# Patient Record
Sex: FEMALE | Race: WHITE | NOT HISPANIC OR LATINO | Employment: OTHER | URBAN - METROPOLITAN AREA
[De-identification: names, ages, dates, MRNs, and addresses within clinical notes are randomized per-mention and may not be internally consistent; named-entity substitution may affect disease eponyms.]

---

## 2021-08-20 PROBLEM — G20.A1 PARKINSON DISEASE (HCC): Status: ACTIVE | Noted: 2018-05-31

## 2021-08-20 PROBLEM — E55.9 VITAMIN D DEFICIENCY: Status: ACTIVE | Noted: 2021-08-20

## 2021-08-20 PROBLEM — N95.1 MENOPAUSAL AND FEMALE CLIMACTERIC STATES: Status: ACTIVE | Noted: 2021-08-20

## 2021-08-20 PROBLEM — E53.8 VITAMIN B12 DEFICIENCY: Status: ACTIVE | Noted: 2021-08-20

## 2022-02-11 PROBLEM — D05.10 DCIS (DUCTAL CARCINOMA IN SITU): Status: ACTIVE | Noted: 2022-02-11

## 2022-02-11 PROBLEM — H26.9 CATARACT: Status: ACTIVE | Noted: 2022-02-11

## 2022-02-11 PROBLEM — G20.A1 PARKINSONS (HCC): Status: ACTIVE | Noted: 2022-02-11

## 2022-02-11 PROBLEM — G25.81 RLS (RESTLESS LEGS SYNDROME): Status: ACTIVE | Noted: 2022-02-11

## 2022-02-28 PROBLEM — N63.25 UNSPECIFIED LUMP IN THE LEFT BREAST, OVERLAPPING QUADRANTS: Status: ACTIVE | Noted: 2022-02-28

## 2022-03-10 PROBLEM — C79.51 BONE METASTASIS: Status: ACTIVE | Noted: 2022-03-10

## 2022-03-18 ENCOUNTER — TELEPHONE (OUTPATIENT)
Dept: CARDIOLOGY | Facility: MEDICAL CENTER | Age: 72
End: 2022-03-18
Payer: MEDICARE

## 2022-03-18 NOTE — TELEPHONE ENCOUNTER
Spoke with pt who confirmed last time seeing cardiology was in 05/12/21 with [facility] (records in care everywhere). Per pt has not seen any other cardiology outside of [facility]. Per pt has not had any recent hospitalizations outside of [facility]. Confirmed with pt that recent lab work and cardiac testing is in pts chart.     Pt request to reschedule. Scheduling team will reach out to assist.

## 2022-04-07 PROBLEM — C50.412 MALIGNANT NEOPLASM OF UPPER-OUTER QUADRANT OF LEFT BREAST IN FEMALE, ESTROGEN RECEPTOR POSITIVE (HCC): Status: ACTIVE | Noted: 2022-04-07

## 2022-04-07 PROBLEM — Z17.0 MALIGNANT NEOPLASM OF UPPER-OUTER QUADRANT OF LEFT BREAST IN FEMALE, ESTROGEN RECEPTOR POSITIVE (HCC): Status: ACTIVE | Noted: 2022-04-07

## 2022-04-15 ENCOUNTER — APPOINTMENT (OUTPATIENT)
Dept: RADIOLOGY | Facility: MEDICAL CENTER | Age: 72
DRG: 641 | End: 2022-04-15
Attending: EMERGENCY MEDICINE
Payer: MEDICARE

## 2022-04-15 ENCOUNTER — HOSPITAL ENCOUNTER (INPATIENT)
Facility: MEDICAL CENTER | Age: 72
LOS: 2 days | DRG: 641 | End: 2022-04-17
Attending: EMERGENCY MEDICINE | Admitting: STUDENT IN AN ORGANIZED HEALTH CARE EDUCATION/TRAINING PROGRAM
Payer: MEDICARE

## 2022-04-15 DIAGNOSIS — E87.6 HYPOKALEMIA: ICD-10-CM

## 2022-04-15 DIAGNOSIS — R64 CANCER CACHEXIA (HCC): ICD-10-CM

## 2022-04-15 DIAGNOSIS — E43 SEVERE PROTEIN-CALORIE MALNUTRITION (HCC): ICD-10-CM

## 2022-04-15 DIAGNOSIS — R07.9 CHEST PAIN, UNSPECIFIED TYPE: ICD-10-CM

## 2022-04-15 PROBLEM — Z71.89 ACP (ADVANCE CARE PLANNING): Status: ACTIVE | Noted: 2022-04-15

## 2022-04-15 PROBLEM — R63.6 UNDERWEIGHT: Status: ACTIVE | Noted: 2022-04-15

## 2022-04-15 PROBLEM — G89.4 CHRONIC PAIN SYNDROME: Status: ACTIVE | Noted: 2022-04-15

## 2022-04-15 LAB
ALBUMIN SERPL BCP-MCNC: 4.6 G/DL (ref 3.2–4.9)
ALBUMIN/GLOB SERPL: 2.3 G/DL
ALP SERPL-CCNC: 116 U/L (ref 30–99)
ALT SERPL-CCNC: 31 U/L (ref 2–50)
ANION GAP SERPL CALC-SCNC: 15 MMOL/L (ref 7–16)
AST SERPL-CCNC: 30 U/L (ref 12–45)
BASOPHILS # BLD AUTO: 0.4 % (ref 0–1.8)
BASOPHILS # BLD: 0.02 K/UL (ref 0–0.12)
BILIRUB SERPL-MCNC: 0.4 MG/DL (ref 0.1–1.5)
BUN SERPL-MCNC: 16 MG/DL (ref 8–22)
CALCIUM SERPL-MCNC: 9.1 MG/DL (ref 8.5–10.5)
CHLORIDE SERPL-SCNC: 100 MMOL/L (ref 96–112)
CO2 SERPL-SCNC: 26 MMOL/L (ref 20–33)
CREAT SERPL-MCNC: 0.4 MG/DL (ref 0.5–1.4)
D DIMER PPP IA.FEU-MCNC: <0.27 UG/ML (FEU) (ref 0–0.5)
EKG IMPRESSION: NORMAL
EOSINOPHIL # BLD AUTO: 0.03 K/UL (ref 0–0.51)
EOSINOPHIL NFR BLD: 0.6 % (ref 0–6.9)
ERYTHROCYTE [DISTWIDTH] IN BLOOD BY AUTOMATED COUNT: 44.4 FL (ref 35.9–50)
GFR SERPLBLD CREATININE-BSD FMLA CKD-EPI: 106 ML/MIN/1.73 M 2
GLOBULIN SER CALC-MCNC: 2 G/DL (ref 1.9–3.5)
GLUCOSE SERPL-MCNC: 93 MG/DL (ref 65–99)
HCT VFR BLD AUTO: 34.9 % (ref 37–47)
HGB BLD-MCNC: 12.3 G/DL (ref 12–16)
IMM GRANULOCYTES # BLD AUTO: 0.01 K/UL (ref 0–0.11)
IMM GRANULOCYTES NFR BLD AUTO: 0.2 % (ref 0–0.9)
LIPASE SERPL-CCNC: 20 U/L (ref 11–82)
LYMPHOCYTES # BLD AUTO: 1.33 K/UL (ref 1–4.8)
LYMPHOCYTES NFR BLD: 25.7 % (ref 22–41)
MCH RBC QN AUTO: 32.6 PG (ref 27–33)
MCHC RBC AUTO-ENTMCNC: 35.2 G/DL (ref 33.6–35)
MCV RBC AUTO: 92.6 FL (ref 81.4–97.8)
MONOCYTES # BLD AUTO: 0.38 K/UL (ref 0–0.85)
MONOCYTES NFR BLD AUTO: 7.3 % (ref 0–13.4)
NEUTROPHILS # BLD AUTO: 3.41 K/UL (ref 2–7.15)
NEUTROPHILS NFR BLD: 65.8 % (ref 44–72)
NRBC # BLD AUTO: 0 K/UL
NRBC BLD-RTO: 0 /100 WBC
PLATELET # BLD AUTO: 247 K/UL (ref 164–446)
PMV BLD AUTO: 9.3 FL (ref 9–12.9)
POTASSIUM SERPL-SCNC: 2.6 MMOL/L (ref 3.6–5.5)
PROT SERPL-MCNC: 6.6 G/DL (ref 6–8.2)
RBC # BLD AUTO: 3.77 M/UL (ref 4.2–5.4)
SODIUM SERPL-SCNC: 141 MMOL/L (ref 135–145)
TROPONIN T SERPL-MCNC: 12 NG/L (ref 6–19)
TROPONIN T SERPL-MCNC: 14 NG/L (ref 6–19)
WBC # BLD AUTO: 5.2 K/UL (ref 4.8–10.8)

## 2022-04-15 PROCEDURE — 36415 COLL VENOUS BLD VENIPUNCTURE: CPT

## 2022-04-15 PROCEDURE — 99285 EMERGENCY DEPT VISIT HI MDM: CPT

## 2022-04-15 PROCEDURE — 93005 ELECTROCARDIOGRAM TRACING: CPT | Performed by: EMERGENCY MEDICINE

## 2022-04-15 PROCEDURE — 85025 COMPLETE CBC W/AUTO DIFF WBC: CPT

## 2022-04-15 PROCEDURE — 770020 HCHG ROOM/CARE - TELE (206)

## 2022-04-15 PROCEDURE — 700101 HCHG RX REV CODE 250: Performed by: STUDENT IN AN ORGANIZED HEALTH CARE EDUCATION/TRAINING PROGRAM

## 2022-04-15 PROCEDURE — 85379 FIBRIN DEGRADATION QUANT: CPT

## 2022-04-15 PROCEDURE — 96365 THER/PROPH/DIAG IV INF INIT: CPT

## 2022-04-15 PROCEDURE — 96368 THER/DIAG CONCURRENT INF: CPT

## 2022-04-15 PROCEDURE — 99223 1ST HOSP IP/OBS HIGH 75: CPT | Mod: AI,25 | Performed by: STUDENT IN AN ORGANIZED HEALTH CARE EDUCATION/TRAINING PROGRAM

## 2022-04-15 PROCEDURE — 96375 TX/PRO/DX INJ NEW DRUG ADDON: CPT

## 2022-04-15 PROCEDURE — 80053 COMPREHEN METABOLIC PANEL: CPT

## 2022-04-15 PROCEDURE — 700111 HCHG RX REV CODE 636 W/ 250 OVERRIDE (IP): Performed by: EMERGENCY MEDICINE

## 2022-04-15 PROCEDURE — A9270 NON-COVERED ITEM OR SERVICE: HCPCS | Performed by: STUDENT IN AN ORGANIZED HEALTH CARE EDUCATION/TRAINING PROGRAM

## 2022-04-15 PROCEDURE — 71045 X-RAY EXAM CHEST 1 VIEW: CPT

## 2022-04-15 PROCEDURE — 99497 ADVNCD CARE PLAN 30 MIN: CPT | Performed by: STUDENT IN AN ORGANIZED HEALTH CARE EDUCATION/TRAINING PROGRAM

## 2022-04-15 PROCEDURE — 83690 ASSAY OF LIPASE: CPT

## 2022-04-15 PROCEDURE — 84484 ASSAY OF TROPONIN QUANT: CPT

## 2022-04-15 PROCEDURE — 700102 HCHG RX REV CODE 250 W/ 637 OVERRIDE(OP): Performed by: STUDENT IN AN ORGANIZED HEALTH CARE EDUCATION/TRAINING PROGRAM

## 2022-04-15 PROCEDURE — 700111 HCHG RX REV CODE 636 W/ 250 OVERRIDE (IP): Performed by: STUDENT IN AN ORGANIZED HEALTH CARE EDUCATION/TRAINING PROGRAM

## 2022-04-15 RX ORDER — POTASSIUM CHLORIDE 20 MEQ/1
40 TABLET, EXTENDED RELEASE ORAL EVERY 4 HOURS
Status: DISPENSED | OUTPATIENT
Start: 2022-04-15 | End: 2022-04-16

## 2022-04-15 RX ORDER — ACETAMINOPHEN 325 MG/1
650 TABLET ORAL EVERY 6 HOURS PRN
Status: DISCONTINUED | OUTPATIENT
Start: 2022-04-15 | End: 2022-04-17 | Stop reason: HOSPADM

## 2022-04-15 RX ORDER — MORPHINE SULFATE 4 MG/ML
4 INJECTION INTRAVENOUS
Status: DISCONTINUED | OUTPATIENT
Start: 2022-04-15 | End: 2022-04-17 | Stop reason: HOSPADM

## 2022-04-15 RX ORDER — GUAIFENESIN/DEXTROMETHORPHAN 100-10MG/5
10 SYRUP ORAL EVERY 6 HOURS PRN
Status: DISCONTINUED | OUTPATIENT
Start: 2022-04-15 | End: 2022-04-17 | Stop reason: HOSPADM

## 2022-04-15 RX ORDER — MULTIVITAMIN WITH IRON
250 TABLET ORAL EVERY EVENING
COMMUNITY

## 2022-04-15 RX ORDER — GABAPENTIN 300 MG/1
300 CAPSULE ORAL 3 TIMES DAILY
Status: DISCONTINUED | OUTPATIENT
Start: 2022-04-15 | End: 2022-04-15

## 2022-04-15 RX ORDER — HYDRALAZINE HYDROCHLORIDE 20 MG/ML
10 INJECTION INTRAMUSCULAR; INTRAVENOUS EVERY 4 HOURS PRN
Status: DISCONTINUED | OUTPATIENT
Start: 2022-04-15 | End: 2022-04-17 | Stop reason: HOSPADM

## 2022-04-15 RX ORDER — POTASSIUM CHLORIDE 7.45 MG/ML
10 INJECTION INTRAVENOUS ONCE
Status: COMPLETED | OUTPATIENT
Start: 2022-04-15 | End: 2022-04-15

## 2022-04-15 RX ORDER — OXYCODONE HYDROCHLORIDE 10 MG/1
10 TABLET ORAL
Status: DISCONTINUED | OUTPATIENT
Start: 2022-04-15 | End: 2022-04-17 | Stop reason: HOSPADM

## 2022-04-15 RX ORDER — MAGNESIUM SULFATE HEPTAHYDRATE 40 MG/ML
2 INJECTION, SOLUTION INTRAVENOUS ONCE
Status: COMPLETED | OUTPATIENT
Start: 2022-04-15 | End: 2022-04-15

## 2022-04-15 RX ORDER — AMOXICILLIN 250 MG
2 CAPSULE ORAL 2 TIMES DAILY
Status: DISCONTINUED | OUTPATIENT
Start: 2022-04-15 | End: 2022-04-17 | Stop reason: HOSPADM

## 2022-04-15 RX ORDER — CHOLECALCIFEROL (VITAMIN D3) 125 MCG
1000 CAPSULE ORAL DAILY
Status: DISCONTINUED | OUTPATIENT
Start: 2022-04-16 | End: 2022-04-17 | Stop reason: HOSPADM

## 2022-04-15 RX ORDER — OXYCODONE HYDROCHLORIDE 5 MG/1
5 TABLET ORAL
Status: DISCONTINUED | OUTPATIENT
Start: 2022-04-15 | End: 2022-04-17 | Stop reason: HOSPADM

## 2022-04-15 RX ORDER — POLYETHYLENE GLYCOL 3350 17 G/17G
1 POWDER, FOR SOLUTION ORAL
Status: DISCONTINUED | OUTPATIENT
Start: 2022-04-15 | End: 2022-04-17 | Stop reason: HOSPADM

## 2022-04-15 RX ORDER — ONDANSETRON 2 MG/ML
4 INJECTION INTRAMUSCULAR; INTRAVENOUS EVERY 4 HOURS PRN
Status: DISCONTINUED | OUTPATIENT
Start: 2022-04-15 | End: 2022-04-17 | Stop reason: HOSPADM

## 2022-04-15 RX ORDER — SODIUM CHLORIDE AND POTASSIUM CHLORIDE 150; 900 MG/100ML; MG/100ML
INJECTION, SOLUTION INTRAVENOUS CONTINUOUS
Status: DISPENSED | OUTPATIENT
Start: 2022-04-15 | End: 2022-04-16

## 2022-04-15 RX ORDER — ONDANSETRON 4 MG/1
4 TABLET, ORALLY DISINTEGRATING ORAL EVERY 4 HOURS PRN
Status: DISCONTINUED | OUTPATIENT
Start: 2022-04-15 | End: 2022-04-17 | Stop reason: HOSPADM

## 2022-04-15 RX ORDER — MORPHINE SULFATE 4 MG/ML
4 INJECTION INTRAVENOUS ONCE
Status: COMPLETED | OUTPATIENT
Start: 2022-04-15 | End: 2022-04-15

## 2022-04-15 RX ORDER — ACETAMINOPHEN 325 MG/1
650 TABLET ORAL EVERY 4 HOURS PRN
COMMUNITY

## 2022-04-15 RX ORDER — BISACODYL 10 MG
10 SUPPOSITORY, RECTAL RECTAL
Status: DISCONTINUED | OUTPATIENT
Start: 2022-04-15 | End: 2022-04-17 | Stop reason: HOSPADM

## 2022-04-15 RX ADMIN — POTASSIUM CHLORIDE 40 MEQ: 20 TABLET, EXTENDED RELEASE ORAL at 21:00

## 2022-04-15 RX ADMIN — POTASSIUM CHLORIDE AND SODIUM CHLORIDE: 900; 150 INJECTION, SOLUTION INTRAVENOUS at 21:35

## 2022-04-15 RX ADMIN — POTASSIUM CHLORIDE 10 MEQ: 7.46 INJECTION, SOLUTION INTRAVENOUS at 20:01

## 2022-04-15 RX ADMIN — FENTANYL CITRATE 50 MCG: 50 INJECTION, SOLUTION INTRAMUSCULAR; INTRAVENOUS at 19:22

## 2022-04-15 RX ADMIN — MAGNESIUM SULFATE HEPTAHYDRATE 2 G: 40 INJECTION, SOLUTION INTRAVENOUS at 20:01

## 2022-04-15 RX ADMIN — MORPHINE SULFATE 4 MG: 4 INJECTION INTRAVENOUS at 18:00

## 2022-04-15 ASSESSMENT — ENCOUNTER SYMPTOMS
BRUISES/BLEEDS EASILY: 0
DEPRESSION: 0
WEIGHT LOSS: 1
FEVER: 0
WEAKNESS: 1
FLANK PAIN: 0
HEADACHES: 0
BACK PAIN: 1
MYALGIAS: 1
DOUBLE VISION: 0
PALPITATIONS: 1
COUGH: 0
BLURRED VISION: 0
CHILLS: 0
DIZZINESS: 1
HEARTBURN: 0
FOCAL WEAKNESS: 0
NAUSEA: 0
SHORTNESS OF BREATH: 0

## 2022-04-15 ASSESSMENT — LIFESTYLE VARIABLES
EVER FELT BAD OR GUILTY ABOUT YOUR DRINKING: NO
CONSUMPTION TOTAL: NEGATIVE
ON A TYPICAL DAY WHEN YOU DRINK ALCOHOL HOW MANY DRINKS DO YOU HAVE: 0
ALCOHOL_USE: NO
HAVE PEOPLE ANNOYED YOU BY CRITICIZING YOUR DRINKING: NO
DOES PATIENT WANT TO STOP DRINKING: CANNOT ASSESS
SUBSTANCE_ABUSE: 0
TOTAL SCORE: 0
HOW MANY TIMES IN THE PAST YEAR HAVE YOU HAD 5 OR MORE DRINKS IN A DAY: 0
TOTAL SCORE: 0
TOTAL SCORE: 0
EVER HAD A DRINK FIRST THING IN THE MORNING TO STEADY YOUR NERVES TO GET RID OF A HANGOVER: NO
HAVE YOU EVER FELT YOU SHOULD CUT DOWN ON YOUR DRINKING: NO
AVERAGE NUMBER OF DAYS PER WEEK YOU HAVE A DRINK CONTAINING ALCOHOL: 0

## 2022-04-15 ASSESSMENT — COGNITIVE AND FUNCTIONAL STATUS - GENERAL
STANDING UP FROM CHAIR USING ARMS: A LOT
PERSONAL GROOMING: A LOT
MOBILITY SCORE: 14
DAILY ACTIVITIY SCORE: 15
TOILETING: A LITTLE
MOVING TO AND FROM BED TO CHAIR: A LITTLE
CLIMB 3 TO 5 STEPS WITH RAILING: A LOT
MOVING FROM LYING ON BACK TO SITTING ON SIDE OF FLAT BED: A LITTLE
HELP NEEDED FOR BATHING: A LITTLE
SUGGESTED CMS G CODE MODIFIER MOBILITY: CL
WALKING IN HOSPITAL ROOM: A LOT
EATING MEALS: A LOT
TURNING FROM BACK TO SIDE WHILE IN FLAT BAD: A LOT
DRESSING REGULAR UPPER BODY CLOTHING: A LOT
SUGGESTED CMS G CODE MODIFIER DAILY ACTIVITY: CK
DRESSING REGULAR LOWER BODY CLOTHING: A LITTLE

## 2022-04-15 ASSESSMENT — PATIENT HEALTH QUESTIONNAIRE - PHQ9
SUM OF ALL RESPONSES TO PHQ9 QUESTIONS 1 AND 2: 0
1. LITTLE INTEREST OR PLEASURE IN DOING THINGS: NOT AT ALL
2. FEELING DOWN, DEPRESSED, IRRITABLE, OR HOPELESS: NOT AT ALL

## 2022-04-15 ASSESSMENT — FIBROSIS 4 INDEX
FIB4 SCORE: 1.55
FIB4 SCORE: 1.35

## 2022-04-15 ASSESSMENT — PAIN DESCRIPTION - PAIN TYPE: TYPE: ACUTE PAIN

## 2022-04-15 NOTE — ED TRIAGE NOTES
Pt bib ems medical office.  Chief Complaint   Patient presents with   • Chest Pain   • Epigastric Pain     Pt was getting a vit C injection by homeopathic cancer doctor, has sudden onset of CP. Drank baking soda which did not help. EMS called.     PTA Asa, NTG, Fentanyl, Zofran. Pt reports she is feeling better now.     Pt on a gown, connected to monitor, chart up for ERP.

## 2022-04-15 NOTE — ED PROVIDER NOTES
ED Provider Note    CHIEF COMPLAINT  Chief Complaint   Patient presents with   • Chest Pain   • Epigastric Pain       HPI  Shenabrenda Lane is a 71 y.o. female who presents with chief complaint of chest pain.  Patient has a history of breast cancer, she also has a history of Parkinson's.  Patient's breast cancer is metastatic, with known metastases throughout her spine.  She reports that she started seeing a homeopathic doctor, via telemedicine in [place], who ordered a vitamin C infusion for her.  This was done today at an infusion center.  While patient was receiving the vitamin C infusion, once he started running she started developing some chest pain.  Chest pain is most localized in her central chest with radiations into bilateral hemithoraces.  She denies any associated pain radiating to her back or any tearing quality the pain.  She denies any fevers or chills.  She denies any associated cough, she denies any associated pleuritic pain.  She denies any unilateral leg swelling.  She denies any history of blood clots.  Patient reports pain is constant without any overt identifiable provoking or relieving factors.    REVIEW OF SYSTEMS  ROS    See HPI for further details. All other systems are negative.     PAST MEDICAL HISTORY   has a past medical history of Breast cancer (HCC), Chicken pox, Glaucoma, Hepatitis A, History of hormone therapy, Jaundice, Measles, and Parkinson disease (HCC).    SOCIAL HISTORY  Social History     Tobacco Use   • Smoking status: Never Smoker   • Smokeless tobacco: Never Used   Vaping Use   • Vaping Use: Never used   Substance and Sexual Activity   • Alcohol use: Never   • Drug use: Yes     Comment: CBD oil   • Sexual activity: Not on file       SURGICAL HISTORY   has a past surgical history that includes other abdominal surgery and other orthopedic surgery.    CURRENT MEDICATIONS  Home Medications     Reviewed by Krystal Contreras R.N. (Registered Nurse) on 04/15/22 at 8909  Med List  Status: Partial   Medication Last Dose Status   Aloe Vera 500 MG Cap  Active   ascorbic acid (VITAMIN C) 250 MG tablet  Active   B Complex Cap  Active   carbidopa-levodopa (SINEMET)  MG Tab  Active   Cholecalciferol 1.25 MG (73918 UT) Tab  Active   Cyanocobalamin (VITAMIN B 12 PO)  Active   DIGESTIVE ENZYMES PO  Active   gabapentin (NEURONTIN) 300 MG Cap  Active   ibuprofen (MOTRIN) 200 MG Tab  Active   magnesium sulfate 50 % Solution  Active   Palbociclib 125 MG Cap  Active   Potassium 99 MG Tab  Active   Probiotic Product (PROBIOTIC-10 PO)  Active   Turmeric 500 MG Cap  Active                ALLERGIES  Allergies   Allergen Reactions   • Sulfa Drugs        PHYSICAL EXAM  Vitals:    04/15/22 1552   BP:    Pulse:    Resp:    Temp: 36.6 °C (97.8 °F)   SpO2:        Physical Exam  Constitutional:       Appearance: She is well-developed.      Comments: Cachectic   HENT:      Head: Normocephalic and atraumatic.   Eyes:      Conjunctiva/sclera: Conjunctivae normal.   Cardiovascular:      Rate and Rhythm: Normal rate and regular rhythm.   Pulmonary:      Effort: Pulmonary effort is normal.      Breath sounds: Normal breath sounds.   Abdominal:      General: Bowel sounds are normal. There is no distension.      Palpations: Abdomen is soft.      Tenderness: There is no abdominal tenderness. There is no rebound.   Musculoskeletal:      Cervical back: Normal range of motion and neck supple.   Skin:     General: Skin is warm and dry.      Findings: No rash.   Neurological:      Mental Status: She is alert and oriented to person, place, and time.   Psychiatric:         Behavior: Behavior normal.           DIAGNOSTIC STUDIES / PROCEDURES    EKG  See below    LABS  Results for orders placed or performed during the hospital encounter of 04/15/22   CBC with Differential   Result Value Ref Range    WBC 5.2 4.8 - 10.8 K/uL    RBC 3.77 (L) 4.20 - 5.40 M/uL    Hemoglobin 12.3 12.0 - 16.0 g/dL    Hematocrit 34.9 (L) 37.0 - 47.0 %     MCV 92.6 81.4 - 97.8 fL    MCH 32.6 27.0 - 33.0 pg    MCHC 35.2 (H) 33.6 - 35.0 g/dL    RDW 44.4 35.9 - 50.0 fL    Platelet Count 247 164 - 446 K/uL    MPV 9.3 9.0 - 12.9 fL    Neutrophils-Polys 65.80 44.00 - 72.00 %    Lymphocytes 25.70 22.00 - 41.00 %    Monocytes 7.30 0.00 - 13.40 %    Eosinophils 0.60 0.00 - 6.90 %    Basophils 0.40 0.00 - 1.80 %    Immature Granulocytes 0.20 0.00 - 0.90 %    Nucleated RBC 0.00 /100 WBC    Neutrophils (Absolute) 3.41 2.00 - 7.15 K/uL    Lymphs (Absolute) 1.33 1.00 - 4.80 K/uL    Monos (Absolute) 0.38 0.00 - 0.85 K/uL    Eos (Absolute) 0.03 0.00 - 0.51 K/uL    Baso (Absolute) 0.02 0.00 - 0.12 K/uL    Immature Granulocytes (abs) 0.01 0.00 - 0.11 K/uL    NRBC (Absolute) 0.00 K/uL   Complete Metabolic Panel (CMP)   Result Value Ref Range    Sodium 141 135 - 145 mmol/L    Potassium 2.6 (LL) 3.6 - 5.5 mmol/L    Chloride 100 96 - 112 mmol/L    Co2 26 20 - 33 mmol/L    Anion Gap 15.0 7.0 - 16.0    Glucose 93 65 - 99 mg/dL    Bun 16 8 - 22 mg/dL    Creatinine 0.40 (L) 0.50 - 1.40 mg/dL    Calcium 9.1 8.5 - 10.5 mg/dL    AST(SGOT) 30 12 - 45 U/L    ALT(SGPT) 31 2 - 50 U/L    Alkaline Phosphatase 116 (H) 30 - 99 U/L    Total Bilirubin 0.4 0.1 - 1.5 mg/dL    Albumin 4.6 3.2 - 4.9 g/dL    Total Protein 6.6 6.0 - 8.2 g/dL    Globulin 2.0 1.9 - 3.5 g/dL    A-G Ratio 2.3 g/dL   Troponin   Result Value Ref Range    Troponin T 12 6 - 19 ng/L   D-DIMER   Result Value Ref Range    D-Dimer Screen <0.27 0.00 - 0.50 ug/mL (FEU)   LIPASE   Result Value Ref Range    Lipase 20 11 - 82 U/L   ESTIMATED GFR   Result Value Ref Range    GFR (CKD-EPI) 106 >60 mL/min/1.73 m 2   EKG   Result Value Ref Range    Report       [facility] Emergency Dept.    Test Date:  2022-04-15  Pt Name:    ALLY  FAST FAST             Department: ER  MRN:        [id number]                      Room:       [facility]  Gender:     Female                       Technician: [id number]  :        [id number]                    Requested By:ER TRIAGE PROTOCOL  Order #:    [id number]                    Reading MD: Agustin Lyman MD    Measurements  Intervals                                Axis  Rate:       82                           P:          60  CT:         152                          QRS:        30  QRSD:       76                           T:          84  QT:         424  QTc:        496    Interpretive Statements  NormalEKG is normal sinus rhythm, axis normal intervals, bizarre sloping T  waves  in V3 but this is not in any other contiguous leads.  No ST depression.  Electronically Signed On 4- 16:36:36 PDT by Agustin Lyman MD           RADIOLOGY  DX-CHEST-PORTABLE (1 VIEW)   Final Result         1. No acute cardiopulmonary abnormalities are identified.              COURSE & MEDICAL DECISION MAKING  Pertinent Labs & Imaging studies reviewed. (See chart for details)    Well-appearing patient here with very reassuring vitals, patient with chest pain after receiving IV dose of vitamin C.  Unlikely that the vitamin C is actually causative here though certainly a hypersensitivity reaction or iatrogenic causes possible.  Patient's EKG fails to reveal any evidence of acute regional ischemia.  She certainly is at risk for PE.  Will check an x-ray first to ensure that patient does not have a large pleural effusion or alternative cause of her symptoms, if x-ray is unremarkable will check CTA.  Patient's basic labs reveal severe hypokalemia.  Her CTA fails to reveal any acute pulmonary embolism.  Given her severe electrolyte abnormalities patient will be admitted for further care.  I discussed case with hospitalist who is agreed to admit.    The patient will return for worsening symptoms and is stable at the time of discharge. The patient verbalizes understanding and will comply.    FINAL IMPRESSION    1. Hypokalemia    2. Chest pain, unspecified type            Electronically signed by: Nura Velez M.D., 4/15/2022 4:06 PM

## 2022-04-16 LAB
ALBUMIN SERPL BCP-MCNC: 4.1 G/DL (ref 3.2–4.9)
BASOPHILS # BLD AUTO: 0.6 % (ref 0–1.8)
BASOPHILS # BLD: 0.03 K/UL (ref 0–0.12)
BUN SERPL-MCNC: 14 MG/DL (ref 8–22)
CALCIUM SERPL-MCNC: 8.8 MG/DL (ref 8.5–10.5)
CHLORIDE SERPL-SCNC: 103 MMOL/L (ref 96–112)
CO2 SERPL-SCNC: 25 MMOL/L (ref 20–33)
CREAT SERPL-MCNC: 0.4 MG/DL (ref 0.5–1.4)
EOSINOPHIL # BLD AUTO: 0.05 K/UL (ref 0–0.51)
EOSINOPHIL NFR BLD: 1 % (ref 0–6.9)
ERYTHROCYTE [DISTWIDTH] IN BLOOD BY AUTOMATED COUNT: 45.3 FL (ref 35.9–50)
GFR SERPLBLD CREATININE-BSD FMLA CKD-EPI: 106 ML/MIN/1.73 M 2
GLUCOSE SERPL-MCNC: 95 MG/DL (ref 65–99)
HCT VFR BLD AUTO: 33.2 % (ref 37–47)
HGB BLD-MCNC: 11.7 G/DL (ref 12–16)
IMM GRANULOCYTES # BLD AUTO: 0.01 K/UL (ref 0–0.11)
IMM GRANULOCYTES NFR BLD AUTO: 0.2 % (ref 0–0.9)
LYMPHOCYTES # BLD AUTO: 1.42 K/UL (ref 1–4.8)
LYMPHOCYTES NFR BLD: 27.7 % (ref 22–41)
MAGNESIUM SERPL-MCNC: 2.3 MG/DL (ref 1.5–2.5)
MCH RBC QN AUTO: 32.6 PG (ref 27–33)
MCHC RBC AUTO-ENTMCNC: 35.2 G/DL (ref 33.6–35)
MCV RBC AUTO: 92.5 FL (ref 81.4–97.8)
MONOCYTES # BLD AUTO: 0.45 K/UL (ref 0–0.85)
MONOCYTES NFR BLD AUTO: 8.8 % (ref 0–13.4)
NEUTROPHILS # BLD AUTO: 3.16 K/UL (ref 2–7.15)
NEUTROPHILS NFR BLD: 61.7 % (ref 44–72)
NRBC # BLD AUTO: 0 K/UL
NRBC BLD-RTO: 0 /100 WBC
PHOSPHATE SERPL-MCNC: 2.3 MG/DL (ref 2.5–4.5)
PLATELET # BLD AUTO: 219 K/UL (ref 164–446)
PMV BLD AUTO: 9.2 FL (ref 9–12.9)
POTASSIUM SERPL-SCNC: 3.3 MMOL/L (ref 3.6–5.5)
POTASSIUM SERPL-SCNC: 3.4 MMOL/L (ref 3.6–5.5)
RBC # BLD AUTO: 3.59 M/UL (ref 4.2–5.4)
SODIUM SERPL-SCNC: 139 MMOL/L (ref 135–145)
TROPONIN T SERPL-MCNC: 13 NG/L (ref 6–19)
WBC # BLD AUTO: 5.1 K/UL (ref 4.8–10.8)

## 2022-04-16 PROCEDURE — 84484 ASSAY OF TROPONIN QUANT: CPT

## 2022-04-16 PROCEDURE — 83735 ASSAY OF MAGNESIUM: CPT

## 2022-04-16 PROCEDURE — 85025 COMPLETE CBC W/AUTO DIFF WBC: CPT

## 2022-04-16 PROCEDURE — 700111 HCHG RX REV CODE 636 W/ 250 OVERRIDE (IP): Performed by: STUDENT IN AN ORGANIZED HEALTH CARE EDUCATION/TRAINING PROGRAM

## 2022-04-16 PROCEDURE — A9270 NON-COVERED ITEM OR SERVICE: HCPCS | Performed by: NURSE PRACTITIONER

## 2022-04-16 PROCEDURE — 36415 COLL VENOUS BLD VENIPUNCTURE: CPT

## 2022-04-16 PROCEDURE — 80069 RENAL FUNCTION PANEL: CPT

## 2022-04-16 PROCEDURE — 770020 HCHG ROOM/CARE - TELE (206)

## 2022-04-16 PROCEDURE — 700102 HCHG RX REV CODE 250 W/ 637 OVERRIDE(OP): Performed by: NURSE PRACTITIONER

## 2022-04-16 PROCEDURE — 84132 ASSAY OF SERUM POTASSIUM: CPT

## 2022-04-16 PROCEDURE — 700102 HCHG RX REV CODE 250 W/ 637 OVERRIDE(OP): Performed by: STUDENT IN AN ORGANIZED HEALTH CARE EDUCATION/TRAINING PROGRAM

## 2022-04-16 PROCEDURE — A9270 NON-COVERED ITEM OR SERVICE: HCPCS | Performed by: STUDENT IN AN ORGANIZED HEALTH CARE EDUCATION/TRAINING PROGRAM

## 2022-04-16 PROCEDURE — 99232 SBSQ HOSP IP/OBS MODERATE 35: CPT | Performed by: INTERNAL MEDICINE

## 2022-04-16 PROCEDURE — 700101 HCHG RX REV CODE 250: Performed by: NURSE PRACTITIONER

## 2022-04-16 RX ORDER — MENTHOL AND METHYL SALICYLATE 7.6; 29 G/100G; G/100G
OINTMENT TOPICAL PRN
Status: DISCONTINUED | OUTPATIENT
Start: 2022-04-16 | End: 2022-04-17 | Stop reason: HOSPADM

## 2022-04-16 RX ORDER — POTASSIUM CHLORIDE 20 MEQ/1
20 TABLET, EXTENDED RELEASE ORAL ONCE
Status: DISCONTINUED | OUTPATIENT
Start: 2022-04-16 | End: 2022-04-16

## 2022-04-16 RX ORDER — POTASSIUM CHLORIDE 7.45 MG/ML
10 INJECTION INTRAVENOUS
Status: DISPENSED | OUTPATIENT
Start: 2022-04-16 | End: 2022-04-16

## 2022-04-16 RX ORDER — POTASSIUM CHLORIDE 7.45 MG/ML
10 INJECTION INTRAVENOUS 2 TIMES DAILY
Status: COMPLETED | OUTPATIENT
Start: 2022-04-16 | End: 2022-04-16

## 2022-04-16 RX ORDER — SODIUM CHLORIDE AND POTASSIUM CHLORIDE 150; 900 MG/100ML; MG/100ML
1000 INJECTION, SOLUTION INTRAVENOUS CONTINUOUS
Status: DISPENSED | OUTPATIENT
Start: 2022-04-16 | End: 2022-04-17

## 2022-04-16 RX ORDER — POTASSIUM CHLORIDE 20 MEQ/1
40 TABLET, EXTENDED RELEASE ORAL ONCE
Status: COMPLETED | OUTPATIENT
Start: 2022-04-16 | End: 2022-04-16

## 2022-04-16 RX ADMIN — POTASSIUM CHLORIDE AND SODIUM CHLORIDE 1000 ML: 900; 150 INJECTION, SOLUTION INTRAVENOUS at 17:14

## 2022-04-16 RX ADMIN — SENNOSIDES AND DOCUSATE SODIUM 2 TABLET: 50; 8.6 TABLET ORAL at 18:00

## 2022-04-16 RX ADMIN — DIBASIC SODIUM PHOSPHATE, MONOBASIC POTASSIUM PHOSPHATE AND MONOBASIC SODIUM PHOSPHATE 250 MG: 852; 155; 130 TABLET ORAL at 18:00

## 2022-04-16 RX ADMIN — SENNOSIDES AND DOCUSATE SODIUM 2 TABLET: 50; 8.6 TABLET ORAL at 05:36

## 2022-04-16 RX ADMIN — POTASSIUM CHLORIDE 40 MEQ: 20 TABLET, EXTENDED RELEASE ORAL at 15:49

## 2022-04-16 RX ADMIN — CHOLECALCIFEROL TAB 125 MCG (5000 UNIT) 5000 UNITS: 125 TAB at 05:36

## 2022-04-16 RX ADMIN — POTASSIUM CHLORIDE 10 MEQ: 10 INJECTION, SOLUTION INTRAVENOUS at 09:03

## 2022-04-16 RX ADMIN — POTASSIUM CHLORIDE 10 MEQ: 10 INJECTION, SOLUTION INTRAVENOUS at 06:32

## 2022-04-16 RX ADMIN — ACETAMINOPHEN 650 MG: 325 TABLET, FILM COATED ORAL at 20:40

## 2022-04-16 RX ADMIN — DIBASIC SODIUM PHOSPHATE, MONOBASIC POTASSIUM PHOSPHATE AND MONOBASIC SODIUM PHOSPHATE 250 MG: 852; 155; 130 TABLET ORAL at 11:05

## 2022-04-16 RX ADMIN — OXYCODONE 5 MG: 5 TABLET ORAL at 11:04

## 2022-04-16 RX ADMIN — MENTHOL AND METHYL SALICYLATE: 7.6; 29 OINTMENT TOPICAL at 20:27

## 2022-04-16 RX ADMIN — POTASSIUM CHLORIDE 10 MEQ: 7.46 INJECTION, SOLUTION INTRAVENOUS at 02:14

## 2022-04-16 RX ADMIN — ACETAMINOPHEN 650 MG: 325 TABLET, FILM COATED ORAL at 09:03

## 2022-04-16 RX ADMIN — CYANOCOBALAMIN TAB 500 MCG 1000 MCG: 500 TAB at 05:35

## 2022-04-16 RX ADMIN — POTASSIUM CHLORIDE 10 MEQ: 7.46 INJECTION, SOLUTION INTRAVENOUS at 03:56

## 2022-04-16 RX ADMIN — DIBASIC SODIUM PHOSPHATE, MONOBASIC POTASSIUM PHOSPHATE AND MONOBASIC SODIUM PHOSPHATE 250 MG: 852; 155; 130 TABLET ORAL at 09:03

## 2022-04-16 RX ADMIN — POTASSIUM CHLORIDE 40 MEQ: 20 TABLET, EXTENDED RELEASE ORAL at 20:26

## 2022-04-16 ASSESSMENT — ENCOUNTER SYMPTOMS
SHORTNESS OF BREATH: 0
DOUBLE VISION: 0
WEIGHT LOSS: 1
PALPITATIONS: 0
DIZZINESS: 0
NERVOUS/ANXIOUS: 1
NAUSEA: 0
CHILLS: 0
HEADACHES: 0
HEARTBURN: 0
FEVER: 0
COUGH: 0
WEAKNESS: 1
ABDOMINAL PAIN: 0
BACK PAIN: 0
BLURRED VISION: 0

## 2022-04-16 ASSESSMENT — FIBROSIS 4 INDEX: FIB4 SCORE: 1.75

## 2022-04-16 ASSESSMENT — LIFESTYLE VARIABLES: SUBSTANCE_ABUSE: 0

## 2022-04-16 ASSESSMENT — PAIN DESCRIPTION - PAIN TYPE: TYPE: ACUTE PAIN

## 2022-04-16 NOTE — PROGRESS NOTES
4 Eyes Skin Assessment Completed by LIDIA Gant and LIDIA Christianson.    Head WDL  Ears WDL  Nose WDL  Mouth WDL  Neck WDL  Breast/Chest WDL  Shoulder Blades WDL  Spine WDL  (R) Arm/Elbow/Hand Redness and Blanching  (L) Arm/Elbow/Hand Redness and Blanching  Abdomen WDL  Groin WDL  Scrotum/Coccyx/Buttocks WDL  (R) Leg WDL  (L) Leg WDL  (R) Heel/Foot/Toe Redness, baseline from neuropathy per pt  (L) Heel/Foot/Toe Redness, baseline from neuropathy per pt            Devices In Places ECG, Tele Box and Pulse Ox      Interventions In Place Elbow Mepilex, Q2 Turns and Pressure Redistribution Mattress    Possible Skin Injury No    Pictures Uploaded Into Epic N/A  Wound Consult Placed N/A  RN Wound Prevention Protocol Ordered No

## 2022-04-16 NOTE — ED NOTES
Med rec completed per patient and spouse at bedside.  Allergies reviewed with patient.  No outpatient antibiotics in the last 30 days.  Patient's preferred pharmacy: [facility] in [place].    Patient states that she has stopped taking all of her prescription medications. Per patient she is taking Mucuna pruriens (velvet bean) and tyrosine powder instead of carbidopa-levodopa; patient reports that she had doses of Mucuna pruriens and tyrosine around 19:00 while here in the E.D. All prescription medications (carbidopa-levodopa, gabapentin, IBRANCE) removed from med rec at this time.

## 2022-04-16 NOTE — DISCHARGE PLANNING
Anticipated Discharge Disposition: Home with HH    Action: Was made aware pt needs HH services for dc. Per APN anticipate dc today or tomorrow. Patient lives in [place] and has Medicare for primary coverage. Under continuity of care, LMSW completed choice forms for  providers that service her area and are contracted with her insurance.    1) [facility]  2) [facility]  3) [facility]     Choice form completed and faxed to [facility] for processing     Barriers to Discharge: HH acceptance and medical clearance     Plan: Follow up with [facility] regarding HH referral

## 2022-04-16 NOTE — ASSESSMENT & PLAN NOTE
Secondary to stage IV metastatic breast cancer  Reported chest pain, back pain, pain all over  ?May likely benefit from palliative radiation therapy  Palliative care consulted for assistance

## 2022-04-16 NOTE — DISCHARGE PLANNING
Received Choice form at 1105  Agency/Facility Name: [facility]  Referral sent per Choice form at 1103 0025- Spoke To: On call RN  Agency/Facility Name: [facility]  Plan or Request:BRI called to f/u, Senait not in today , on call RN does not process referrals, message will be passed to Shasha. BRI left call back number.    8682- Spoke To: Senait  [facility]  Plan or Request: returned call, said she has not received referral. BRI Saldana told her referral was sent thro epic, and went thro at 1105. Offered to resend referrral thro epic or manually.

## 2022-04-16 NOTE — CONSULTS
"Reason for PC Consult: Advance Care Planning    Consulted by: Dr. Guerrero    Assessment:  General:   Per Marely PELLETIER note today, \"71 y.o. 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 Jose Riley) who presented 4/15/2022 with complaints of chest pain back pain.  Patient stated she was seeing her homeopathic doctor who had ordered IV vitamin C infusion.  This was her first time having an IV infusion of vitamin C.  During the therapy, the patient reported a sudden onset of 10 out of 10 chest pain and her homeopath suggested that she come to the ED. Work-up in the ED revealed negative troponin, normal sinus rhythm on EKG, and potassium of 2.6.  Patient was admitted for further management of her hypokalemia and to trend troponins.\"    Dyspnea: No  Last BM:  (pta)  Pain: Yes  Depression: Mood appropriate for situation  Dementia: No    Spiritual:  Is Gnosticist or spirituality important for coping with this illness?    Has a  or spiritual provider visit been requested?      Palliative Performance Scale: 60%    Advance Directive: None on file. Pt reports a copy is complete at home.   DPOA: stated that it is her  Antelmo.  POLST: No    Code Status: updated to DNR/DNI during this encounter.     Social: pt lives with her  Antelmo. They recently moved from [place]. Pt does not have family support locally. Pt has a daughter, Johanny, in [place] and two step children.     Outcome:  Introduced self and role of Palliative Care to Shena and her  Antelmo at bedside.  Assessed pt's understanding of current medical status, overall health picture, and options for future care. Pt reports that she was diagnosed with cancer just as she was moving to [place] which created a delay in treatment. When she had further work up, her cancer was determined to be \"stage four and metastatic.\" Pt explains that " "she has chosen not to have chemo as her \"days are numbered\" either way. Pt has Parkinson's and her  pays for caregivers 24 hours per day.     Explored pt's values, beliefs, and preferences in order to identify GOC. Shena recognizes outside of a \"miracle\" she will not be cured of cancer, however her  believes that if she were to take specialty vitamins from a homeopathic physician she would be able to cure her cancer and Parkinson's. Pt feels hesitant to take any medication as she has concern of it's side effects (she provides example of vitamin C infusion prior to this admission) which appears to be distressing to Antelmo. From PC RN observation, this has created a challenging dynamic in terms of decision making and Antelmo's acceptance of diagosis.     Queried if pt has completed healthcare documents such as Advance Directives in the past. Pt has and states she has a copy at home. She and Antelmo describe that quality of life is important and they both know to \"pull the plug\" if there isn't a chance of recovery. Discussed code status in detail including mechanical ventilation and what resuscitation looks like. After discussion, pt and Antelmo agree that pt would not want CPR or intubation. Updated to DNR/DNI. At this point, pt is tearful and fatigued. Pt requests that PC RN return at 0900 tomorrow to complete GOC discussion. Assisted pt back to bed and updated team on conversation.     Active listening, reflection, reminiscing, validation & normalization, and empathic support utilized throughout this encounter.  All questions answered.  PC contact information given.         Updated: SADE Page and Dr. Steven    Plan: 0900 follow up with Shena at bedside tomorrow.       Thank you for allowing Palliative Care to participate in this patient's care. Please feel free to call [phone] with any questions or concerns.  "

## 2022-04-16 NOTE — ASSESSMENT & PLAN NOTE
PO intake as tolerated  Follows a pescatarian diet  Reports some difficulty with swallowing  Speech therapy ordered for evaluation  Family to bring in palatable foods for patient  Ensure ordered as supplementation

## 2022-04-16 NOTE — CARE PLAN
Problem: Knowledge Deficit - Standard  Goal: Patient and family/care givers will demonstrate understanding of plan of care, disease process/condition, diagnostic tests and medications  4/16/2022 0034 by Alfreda Mendoza, R.N.  Outcome: Progressing  4/16/2022 0034 by Alfreda Mendoza R.N.  Outcome: Progressing     Problem: Skin Integrity  Goal: Skin integrity is maintained or improved  4/16/2022 0034 by SOBIA Ervin.N.  Outcome: Progressing  4/16/2022 0034 by SOBIA Ervin.N.  Outcome: Progressing     Problem: Pain - Standard  Goal: Alleviation of pain or a reduction in pain to the patient’s comfort goal  Outcome: Progressing   The patient is Watcher - Medium risk of patient condition declining or worsening    Shift Goals  Clinical Goals: pain control, replace potassium  Patient Goals: rest    Progress made toward(s) clinical / shift goals:  Pts pain well controll    Patient is not progressing towards the following goals:

## 2022-04-16 NOTE — CARE PLAN
The patient is Stable - Low risk of patient condition declining or worsening    Shift Goals  Clinical Goals: electrolyte stability  Patient Goals: provide comfort to others    Progress made toward(s) clinical / shift goals:    Problem: Knowledge Deficit - Standard  Goal: Patient and family/care givers will demonstrate understanding of plan of care, disease process/condition, diagnostic tests and medications  4/16/2022 1016 by Kaylee Zimmerman R.N.  Outcome: Progressing     Problem: Skin Integrity  Goal: Skin integrity is maintained or improved  4/16/2022 1016 by Kaylee Zimmerman R.N.  Outcome: Progressing     Problem: Pain - Standard  Goal: Alleviation of pain or a reduction in pain to the patient’s comfort goal  4/16/2022 1016 by Kaylee Zimmerman R.N.  Outcome: Progressing       Patient is not progressing towards the following goals:

## 2022-04-16 NOTE — ED NOTES
Pt assisted to br. Pt requesting additional pain meds. Pt reports iv is painful. IV dc'd and restarted in other arm.    Admit MD now @ BS.

## 2022-04-16 NOTE — ASSESSMENT & PLAN NOTE
D/w pt and pt's spouse in ER --ultimately elected to be kept as full CODE STATUS, patient understands the severity of her disease and any treatment for now on will be palliative and not curative.  She requests time to reflect on goal of care. FULL code status for now as per pt's wish  ACP: 20mins

## 2022-04-16 NOTE — PROGRESS NOTES
[facility] Medicine Daily Progress Note    Date of Service  4/16/2022    Chief Complaint  Shena Lane is a 71 y.o. female admitted 4/15/2022 with chest pain    Hospital Course  Shena Lane is a 71 y.o. 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 Jose Riley) who presented 4/15/2022 with complaints of chest pain back pain.  Patient stated she was seeing her homeopathic doctor who had ordered IV vitamin C infusion.  This was her first time having an IV infusion of vitamin C.  During the therapy, the patient reported a sudden onset of 10 out of 10 chest pain and her homeopath suggested that she come to the ED.      Work-up in the ED revealed negative troponin, normal sinus rhythm on EKG, and potassium of 2.6.  Patient was admitted for further management of her hypokalemia and to trend troponins.    Interval Problem Update  4/16/22: Patient seen and examined,  not at bedside.  Reports feeling better than yesterday with no further incidence of chest pain.  Denies shortness of breath, dysuria, or palpitations but does report lack of appetite and energy.  Asked if I would review her homeopathic medications to see if they are contributing to her hypokalemia.    Vital signs stable.  No ectopy on telemetry.  Repeat potassium at 3:30 AM was 3.4.  Phosphorus low at 2.3, replaced.  We will recheck potassium at 1400 today.    I have personally seen and examined the patient at bedside. I discussed the plan of care with patient, bedside RN and Dr. Steven.    Consultants/Specialty  none    Code Status  Full Code    Disposition  Patient is not medically cleared for discharge.   Anticipate discharge to to home with close outpatient follow-up.  I have placed the appropriate orders for post-discharge needs.    Review of Systems  Review of Systems   Constitutional: Positive for malaise/fatigue and weight loss. Negative for chills and  fever.   HENT: Negative for hearing loss.    Eyes: Negative for blurred vision and double vision.   Respiratory: Negative for cough and shortness of breath.    Cardiovascular: Negative for chest pain, palpitations and leg swelling.   Gastrointestinal: Negative for abdominal pain, heartburn and nausea.   Genitourinary: Negative for dysuria.   Musculoskeletal: Negative for back pain.   Neurological: Positive for weakness. Negative for dizziness and headaches.   Psychiatric/Behavioral: Negative for substance abuse. The patient is nervous/anxious.         Physical Exam  Temp:  [36.3 °C (97.4 °F)-36.7 °C (98 °F)] 36.6 °C (97.9 °F)  Pulse:  [76-94] 94  Resp:  [14-20] 17  BP: (120-164)/(70-96) 125/78  SpO2:  [92 %-97 %] 96 %    Physical Exam  Vitals and nursing note reviewed.   Constitutional:       Appearance: She is cachectic. She is ill-appearing.   Cardiovascular:      Rate and Rhythm: Normal rate and regular rhythm.      Pulses: Normal pulses.      Heart sounds: Normal heart sounds.   Pulmonary:      Effort: Pulmonary effort is normal. No respiratory distress.      Breath sounds: Normal breath sounds.   Abdominal:      General: Abdomen is flat.      Palpations: Abdomen is soft.      Tenderness: There is no abdominal tenderness.   Musculoskeletal:         General: Normal range of motion.   Skin:     General: Skin is warm and dry.      Capillary Refill: Capillary refill takes less than 2 seconds.   Neurological:      Mental Status: She is alert and oriented to person, place, and time.      Motor: Weakness present.   Psychiatric:         Mood and Affect: Mood normal.         Behavior: Behavior normal.       Fluids    Intake/Output Summary (Last 24 hours) at 4/16/2022 1113  Last data filed at 4/16/2022 0829  Gross per 24 hour   Intake 97.5 ml   Output 500 ml   Net -402.5 ml       Laboratory  Recent Labs     04/15/22  1619 04/16/22  0341   WBC 5.2 5.1   RBC 3.77* 3.59*   HEMOGLOBIN 12.3 11.7*   HEMATOCRIT 34.9* 33.2*   MCV  92.6 92.5   MCH 32.6 32.6   MCHC 35.2* 35.2*   RDW 44.4 45.3   PLATELETCT 247 219   MPV 9.3 9.2     Recent Labs     04/15/22  1619 04/16/22  0341   SODIUM 141 139   POTASSIUM 2.6* 3.4*   CHLORIDE 100 103   CO2 26 25   GLUCOSE 93 95   BUN 16 14   CREATININE 0.40* 0.40*   CALCIUM 9.1 8.8                   Imaging  DX-CHEST-PORTABLE (1 VIEW)   Final Result         1. No acute cardiopulmonary abnormalities are identified.           Assessment/Plan  * Hypokalemia- (present on admission)  Assessment & Plan  K 2.6 on admission  IV + PO replacement  Replace Mg  Repeat labs and replace as appropriate    Cancer cachexia (HCC)  Assessment & Plan  Supportive care    Underweight  Assessment & Plan  PO intake as tolerated    Severe protein-calorie malnutrition (HCC)  Assessment & Plan  PO intake as tolerated  Follows a pescatarian diet  Reports some difficulty with swallowing  Speech therapy ordered for evaluation  Family to bring in palatable foods for patient  Ensure ordered as supplementation    ACP (advance care planning)  Assessment & Plan  D/w pt and pt's spouse in ER --ultimately elected to be kept as full CODE STATUS, patient understands the severity of her disease and any treatment for now on will be palliative and not curative.  She requests time to reflect on goal of care. FULL code status for now as per pt's wish  ACP: 20mins    Chronic pain syndrome  Assessment & Plan  Secondary to stage IV metastatic breast cancer  Reported chest pain, back pain, pain all over  ?May likely benefit from palliative radiation therapy  Palliative care consulted for assistance     Bone metastasis (HCC)- (present on admission)  Assessment & Plan  Per history  Pain control, supportive care    DCIS (ductal carcinoma in situ)- (present on admission)  Assessment & Plan  Followed by Dr. Jose Riley  HER2+ -- planned for chemotherapy  Outpatient follow-up    Vitamin D deficiency- (present on admission)  Assessment & Plan  On home  supplements    Vitamin B12 deficiency- (present on admission)  Assessment & Plan  On home supplements    Parkinson disease (HCC)  Assessment & Plan  Continue Sinemet       VTE prophylaxis: enoxaparin ppx    I have performed a physical exam and reviewed and updated ROS and Plan today (4/16/2022). In review of yesterday's note (4/15/2022), there are no changes except as documented above.

## 2022-04-16 NOTE — FACE TO FACE
Face to Face Supporting Documentation - Home Health    The encounter with this patient was in whole or in part the primary reason for home health admission.    Date of encounter:   Patient:                    MRN:                       YOB: 2022  Shena Lane  [id number]  1950     Home health to see patient for:  Skilled Nursing care for assessment, interventions & education    Skilled need for:  Exacerbation of Chronic Disease State Metastic cancer    Skilled nursing interventions to include:  Comment: PT/OT    Homebound status evidenced by:  Need the aid of supportive devices such as crutches, canes, wheelchairs or walkers. Leaving home requires a considerable and taxing effort. There is a normal inability to leave the home.    Community Physician to provide follow up care: Ashish Rios P.A.-C.     Optional Interventions? No      I certify the face to face encounter for this home health care referral meets the CMS requirements and the encounter/clinical assessment with the patient was, in whole, or in part, for the medical condition(s) listed above, which is the primary reason for home health care. Based on my clinical findings: the service(s) are medically necessary, support the need for home health care, and the homebound criteria are met.  I certify that this patient has had a face to face encounter by myself.  Marely Vega, [email or web address] - NPI: [id number]

## 2022-04-16 NOTE — H&P
Hospital Medicine History & Physical Note    Date of Service  4/15/2022    Primary Care Physician  Ashish Rios P.A.-C.    Consultants  Palliative care    Code Status  Full Code    Chief Complaint  Chief Complaint   Patient presents with   • Chest Pain   • Epigastric Pain       History of Presenting Illness  Shena Lane is a 71 y.o. 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 Jose Riley) who presented 4/15/2022 with complaint of chest pain back pain.  Patient stated she was seeing homeopathic doctor, who had ordered IV vitamin C infusion which was being completed at that infusion today. During IV infusion therapy, patient reported having chest pain therefore sent to ER for evaluation.  Patient also stated having chest pain, as well as diffuse pain in her back. In ER, nonspecific ST changes on EKG, initial troponin T WNL.  However on her potassium at 2.6.  Admission requested for further evaluation and treatment.    I discussed the plan of care with patient, family and bedside RN.    Review of Systems  Review of Systems   Constitutional: Positive for malaise/fatigue and weight loss. Negative for chills and fever.   HENT: Negative for hearing loss and tinnitus.    Eyes: Negative for blurred vision and double vision.   Respiratory: Negative for cough and shortness of breath.    Cardiovascular: Positive for chest pain and palpitations. Negative for leg swelling.   Gastrointestinal: Negative for heartburn and nausea.   Genitourinary: Negative for dysuria and flank pain.   Musculoskeletal: Positive for back pain, joint pain and myalgias.   Skin: Negative for itching and rash.   Neurological: Positive for dizziness and weakness (Generalized). Negative for focal weakness and headaches.   Endo/Heme/Allergies: Negative for environmental allergies. Does not bruise/bleed easily.   Psychiatric/Behavioral: Negative for  depression and substance abuse.   All other systems reviewed and are negative.      Past Medical History   has a past medical history of Breast cancer (HCC), Chicken pox, Glaucoma, Hepatitis A, History of hormone therapy, Jaundice, Measles, and Parkinson disease (HCC).    Surgical History   has a past surgical history that includes other abdominal surgery and other orthopedic surgery.     Family History  family history includes Cancer in her mother; Hypertension in her mother and sister.   Family history reviewed with patient. There is family history that is pertinent to the chief complaint.     Social History   reports that she has never smoked. She has never used smokeless tobacco. She reports current drug use. She reports that she does not drink alcohol.    Allergies  Allergies   Allergen Reactions   • Sulfa Drugs Hives       Medications  Prior to Admission Medications   Prescriptions Last Dose Informant Patient Reported? Taking?   Aloe Vera 500 MG Cap   Yes No   Sig: Take  by mouth.   B Complex Cap   Yes No   Sig: Take  by mouth every day.   Cholecalciferol 1.25 MG (98245 UT) Tab   Yes No   Sig: Take 5,000 Units by mouth every day.   Cyanocobalamin (VITAMIN B 12 PO)   Yes No   Sig: Take  by mouth. Sublingual   DIGESTIVE ENZYMES PO   Yes No   Sig: Take  by mouth.   Palbociclib 125 MG Cap   No No   Sig: Palbiciclib 125 mg po daily x 21 days/28 days   Patient not taking: Reported on 4/7/2022   Potassium 99 MG Tab   Yes No   Sig: Take 400 mg by mouth.   Probiotic Product (PROBIOTIC-10 PO)   Yes No   Sig: Take  by mouth.   Turmeric 500 MG Cap   Yes No   Sig: Take  by mouth.   ascorbic acid (VITAMIN C) 250 MG tablet   Yes No   Sig: Take  by mouth.   carbidopa-levodopa (SINEMET)  MG Tab   Yes No   Sig: Take 1 Tablet by mouth 3 times a day.   gabapentin (NEURONTIN) 300 MG Cap   No No   Sig: Take 1 Capsule by mouth 3 times a day.   ibuprofen (MOTRIN) 200 MG Tab   Yes No   Sig: Take 200 mg by mouth every 6 hours as  needed.   magnesium sulfate 50 % Solution   Yes No   Sig: Take  by mouth.      Facility-Administered Medications: None       Physical Exam  Temp:  [36.6 °C (97.8 °F)] 36.6 °C (97.8 °F)  Pulse:  [80-93] 82  Resp:  [17-20] 17  BP: (120-164)/(70-93) 159/86  SpO2:  [92 %-97 %] 97 %  Blood Pressure : 149/82   Temperature: 36.6 °C (97.8 °F)   Pulse: 93   Respiration: 17   Pulse Oximetry: 95 %       Physical Exam  Vitals and nursing note reviewed.   Constitutional:       Comments: Frail, cachectic, severely underweight   HENT:      Head: Normocephalic and atraumatic.      Mouth/Throat:      Mouth: Mucous membranes are dry.      Pharynx: Oropharynx is clear.   Eyes:      General: No scleral icterus.     Extraocular Movements: Extraocular movements intact.   Cardiovascular:      Rate and Rhythm: Normal rate and regular rhythm.      Pulses: Normal pulses.      Heart sounds:     No friction rub.   Pulmonary:      Effort: Pulmonary effort is normal. No respiratory distress.      Breath sounds: No wheezing.   Chest:      Chest wall: Tenderness present.   Abdominal:      General: There is no distension.      Palpations: Abdomen is soft.      Tenderness: There is no abdominal tenderness. There is no guarding or rebound.   Musculoskeletal:         General: Tenderness present. No signs of injury.      Cervical back: Neck supple. No tenderness.      Comments: Diffuse tenderness in lumbar, paraspinally   Skin:     General: Skin is warm and dry.      Capillary Refill: Capillary refill takes less than 2 seconds.      Comments: Poor skin turgor   Neurological:      General: No focal deficit present.      Mental Status: She is alert and oriented to person, place, and time.      Motor: Weakness (Nonfocal) present.   Psychiatric:      Comments: Appears very anxious         Laboratory:  Recent Labs     04/15/22  1619   WBC 5.2   RBC 3.77*   HEMOGLOBIN 12.3   HEMATOCRIT 34.9*   MCV 92.6   MCH 32.6   MCHC 35.2*   RDW 44.4   PLATELETCT 247    MPV 9.3     Recent Labs     04/15/22  1619   SODIUM 141   POTASSIUM 2.6*   CHLORIDE 100   CO2 26   GLUCOSE 93   BUN 16   CREATININE 0.40*   CALCIUM 9.1     Recent Labs     04/15/22  1619   ALTSGPT 31   ASTSGOT 30   ALKPHOSPHAT 116*   TBILIRUBIN 0.4   LIPASE 20   GLUCOSE 93         No results for input(s): NTPROBNP in the last 72 hours.      Recent Labs     04/15/22  1619   TROPONINT 12       Imaging:  DX-CHEST-PORTABLE (1 VIEW)   Final Result         1. No acute cardiopulmonary abnormalities are identified.          X-Ray:  I have personally reviewed the images and compared with prior images.  EKG:  I have personally reviewed the images and compared with prior images.    Assessment/Plan:  I anticipate this patient will require at least two midnights for appropriate medical management, necessitating inpatient admission.    * Hypokalemia- (present on admission)  Assessment & Plan  K 2.6 - IV + PO replacement  Replace Mg  Repeat labs and replace as appropriate    Bone metastasis (HCC)- (present on admission)  Assessment & Plan  Per history  Pain control, supportive care    DCIS (ductal carcinoma in situ)- (present on admission)  Assessment & Plan  Followed by Dr. Jose Riley  HER2+ -- planned for chemotherapy  Outpatient follow-up    Parkinson disease (HCC)  Assessment & Plan  Continue Sinemet    Cancer cachexia (HCC)  Assessment & Plan  Supportive care    Chronic pain syndrome  Assessment & Plan  Secondary to stage IV metastatic breast cancer  Reported chest pain, back pain, pain all over  ?May likely benefit from palliative radiation therapy  Palliative care consulted for assistance     Underweight  Assessment & Plan  PO intake as tolerated    Severe protein-calorie malnutrition (HCC)  Assessment & Plan  PO intake as tolerated  ensure    ACP (advance care planning)  Assessment & Plan  D/w pt and pt's spouse in ER --ultimately elected to be kept as full CODE STATUS, patient understands the severity of her disease  and any treatment for now on will be palliative and not curative.  She requests time to reflect on goal of care. FULL code status for now as per pt's wish  ACP: 20mins    Vitamin D deficiency- (present on admission)  Assessment & Plan  Supplement    Vitamin B12 deficiency- (present on admission)  Assessment & Plan  Supplement      VTE prophylaxis: enoxaparin ppx

## 2022-04-16 NOTE — ED NOTES
ERP eval complete. Friend now @ BS. Pt requesting additional pain meds. Pt medicated as ordered.     Pt K+ 2.6. Dr. Velez aware.

## 2022-04-17 ENCOUNTER — APPOINTMENT (OUTPATIENT)
Dept: CARDIOLOGY | Facility: MEDICAL CENTER | Age: 72
DRG: 641 | End: 2022-04-17
Attending: NURSE PRACTITIONER
Payer: MEDICARE

## 2022-04-17 VITALS
OXYGEN SATURATION: 97 % | HEART RATE: 99 BPM | TEMPERATURE: 97 F | WEIGHT: 97 LBS | BODY MASS INDEX: 14.7 KG/M2 | RESPIRATION RATE: 18 BRPM | DIASTOLIC BLOOD PRESSURE: 88 MMHG | HEIGHT: 68 IN | SYSTOLIC BLOOD PRESSURE: 125 MMHG

## 2022-04-17 PROBLEM — E87.6 HYPOKALEMIA: Status: RESOLVED | Noted: 2022-04-15 | Resolved: 2022-04-17

## 2022-04-17 LAB
ERYTHROCYTE [DISTWIDTH] IN BLOOD BY AUTOMATED COUNT: 44.8 FL (ref 35.9–50)
HCT VFR BLD AUTO: 35.8 % (ref 37–47)
HGB BLD-MCNC: 12.7 G/DL (ref 12–16)
LV EJECT FRACT  99904: 70
MCH RBC QN AUTO: 32.7 PG (ref 27–33)
MCHC RBC AUTO-ENTMCNC: 35.5 G/DL (ref 33.6–35)
MCV RBC AUTO: 92.3 FL (ref 81.4–97.8)
PLATELET # BLD AUTO: 229 K/UL (ref 164–446)
PMV BLD AUTO: 9.1 FL (ref 9–12.9)
POTASSIUM SERPL-SCNC: 4.1 MMOL/L (ref 3.6–5.5)
RBC # BLD AUTO: 3.88 M/UL (ref 4.2–5.4)
WBC # BLD AUTO: 5.4 K/UL (ref 4.8–10.8)

## 2022-04-17 PROCEDURE — 93306 TTE W/DOPPLER COMPLETE: CPT

## 2022-04-17 PROCEDURE — 36415 COLL VENOUS BLD VENIPUNCTURE: CPT

## 2022-04-17 PROCEDURE — A9270 NON-COVERED ITEM OR SERVICE: HCPCS | Performed by: STUDENT IN AN ORGANIZED HEALTH CARE EDUCATION/TRAINING PROGRAM

## 2022-04-17 PROCEDURE — 99239 HOSP IP/OBS DSCHRG MGMT >30: CPT | Performed by: INTERNAL MEDICINE

## 2022-04-17 PROCEDURE — 700102 HCHG RX REV CODE 250 W/ 637 OVERRIDE(OP): Performed by: STUDENT IN AN ORGANIZED HEALTH CARE EDUCATION/TRAINING PROGRAM

## 2022-04-17 PROCEDURE — 85027 COMPLETE CBC AUTOMATED: CPT

## 2022-04-17 PROCEDURE — A9270 NON-COVERED ITEM OR SERVICE: HCPCS | Performed by: NURSE PRACTITIONER

## 2022-04-17 PROCEDURE — 93306 TTE W/DOPPLER COMPLETE: CPT | Mod: 26 | Performed by: INTERNAL MEDICINE

## 2022-04-17 PROCEDURE — 700102 HCHG RX REV CODE 250 W/ 637 OVERRIDE(OP): Performed by: NURSE PRACTITIONER

## 2022-04-17 PROCEDURE — 84132 ASSAY OF SERUM POTASSIUM: CPT

## 2022-04-17 RX ORDER — POTASSIUM CHLORIDE 20 MEQ/1
20 TABLET, EXTENDED RELEASE ORAL 3 TIMES DAILY
Qty: 60 TABLET | Refills: 11 | Status: SHIPPED | OUTPATIENT
Start: 2022-04-17

## 2022-04-17 RX ORDER — POTASSIUM CHLORIDE 20 MEQ/1
40 TABLET, EXTENDED RELEASE ORAL 3 TIMES DAILY
Status: DISCONTINUED | OUTPATIENT
Start: 2022-04-17 | End: 2022-04-17 | Stop reason: HOSPADM

## 2022-04-17 RX ORDER — POTASSIUM CHLORIDE 7.45 MG/ML
10 INJECTION INTRAVENOUS
Status: DISCONTINUED | OUTPATIENT
Start: 2022-04-17 | End: 2022-04-17

## 2022-04-17 RX ADMIN — POTASSIUM CHLORIDE 40 MEQ: 20 TABLET, EXTENDED RELEASE ORAL at 13:13

## 2022-04-17 RX ADMIN — POTASSIUM CHLORIDE 40 MEQ: 20 TABLET, EXTENDED RELEASE ORAL at 08:04

## 2022-04-17 RX ADMIN — CHOLECALCIFEROL TAB 125 MCG (5000 UNIT) 5000 UNITS: 125 TAB at 05:19

## 2022-04-17 RX ADMIN — SENNOSIDES AND DOCUSATE SODIUM 2 TABLET: 50; 8.6 TABLET ORAL at 05:19

## 2022-04-17 RX ADMIN — CYANOCOBALAMIN TAB 500 MCG 1000 MCG: 500 TAB at 05:19

## 2022-04-17 NOTE — PALLIATIVE CARE
"Palliative Care follow-up  PC RN met with patient and her  Antelmo at bedside. Pt appears fatigued. Pt's  remained in room, but did not participate in discussion. Pt queried home health and the support it would provide in the home. Pt queried other services at home that she may not already be utilizing. PC RN explained hospice as a future care option. Explained that with a shift into hospice, Shena would be given supportive treatment, allowing her body to die naturally, with no aggressive, invasive treatments given to prolong life. Hospice would manage patient's pain & symptoms, and would work diligently to maximize his dignity and quality of life. Shena says she is focused on \"getting well\" and not hospice. Shena would prefer not to discuss hospice further. PC RN encouraged Shena to complete a  POLST form to reflect current healthcare goals, she declines and states she will email Advance Directive to PC RN after she has returned home. PC RN educated Shena that AD does not include code status, Shena verbalizes understanding and prefers not to complete POLST. PC RN offered spiritual support as Shena has shared that her spirituality is important to her. She declines as she has many friends support her spiritually. All questions answered and she has PC RN card with contact information should questions arise.       Updated: SADE MURILLO and Marely PELLETIER    Plan: will follow and support as needed.     Thank you for allowing Palliative Care to support this patient and family. Contact [phone] for additional assistance, change in patient status, or with any questions/concerns.   "

## 2022-04-17 NOTE — PROGRESS NOTES
Discharge instructions give to patient at bedside. Pt verbalizes understanding and states plans for follow up. New and home medications reviewed, post discharge activity level and worsening of symptoms needing follow up care discussed. Telemetry monitor and IV cathlon removed. All belongings accounted for, all questions answered at this time. Pt taken via wheelchair to car with staff and .

## 2022-04-17 NOTE — DISCHARGE PLANNING
Agency/Facility Name: [facility]   Spoke To: [facility]   Outcome: Per [employer] their clinical nursing director declined pt.    Received Choice form at 8580 4/16  Agency/Facility Name: [facility]  Referral sent per Choice form @ 3184

## 2022-04-17 NOTE — PROGRESS NOTES
Report received, pt care assumed, tele box on and rate verified. VSS and pt is on room airPt aaox4, no signs of distress noted at this time. POC discussed with pt and verbalizes no questions. Pt c/o of no pain at this time. Pt denies any additional needs at this time. Bed in lowest position, bed alarm on, pt educated on fall risk and verbalized understanding, call light within reach, will continue with plan of care.

## 2022-04-17 NOTE — DISCHARGE INSTRUCTIONS
Please make sure that you follow up with your PCP or oncologist within one week to recheck your potassium.    Recommend avoiding all supplementation until discussed with your oncologist.    Ensure that you are eating a nutritious diet with enough calories.    Ensure that you cancel your echocardiogram scheduled for 4/20/2022. It was completed while you were in the hospital.    Discharge Instructions    Discharged to home by car with relative. Discharged via wheelchair, hospital escort: Yes.  Special equipment needed: Not Applicable    Be sure to schedule a follow-up appointment with your primary care doctor or any specialists as instructed.     Discharge Plan:   Diet Plan: Discussed  Activity Level: Discussed  Confirmed Follow up Appointment: Appointment Scheduled  Confirmed Symptoms Management: Discussed  Medication Reconciliation Updated: Yes    I understand that a diet low in cholesterol, fat, and sodium is recommended for good health. Unless I have been given specific instructions below for another diet, I accept this instruction as my diet prescription.   Other diet: n/a    Special Instructions: None    · Is patient discharged on Warfarin / Coumadin?   No         Hypokalemia  Hypokalemia means that the amount of potassium in the blood is lower than normal. Potassium is a chemical (electrolyte) that helps regulate the amount of fluid in the body. It also stimulates muscle tightening (contraction) and helps nerves work properly.  Normally, most of the body's potassium is inside cells, and only a very small amount is in the blood. Because the amount in the blood is so small, minor changes to potassium levels in the blood can be life-threatening.  What are the causes?  This condition may be caused by:  · Antibiotic medicine.  · Diarrhea or vomiting. Taking too much of a medicine that helps you have a bowel movement (laxative) can cause diarrhea and lead to hypokalemia.  · Chronic kidney disease (CKD).  · Medicines  that help the body get rid of excess fluid (diuretics).  · Eating disorders, such as bulimia.  · Low magnesium levels in the body.  · Sweating a lot.  What are the signs or symptoms?  Symptoms of this condition include:  · Weakness.  · Constipation.  · Fatigue.  · Muscle cramps.  · Mental confusion.  · Skipped heartbeats or irregular heartbeat (palpitations).  · Tingling or numbness.  How is this diagnosed?  This condition is diagnosed with a blood test.  How is this treated?  This condition may be treated by:  · Taking potassium supplements by mouth.  · Adjusting the medicines that you take.  · Eating more foods that contain a lot of potassium.  If your potassium level is very low, you may need to get potassium through an IV and be monitored in the hospital.  Follow these instructions at home:    · Take over-the-counter and prescription medicines only as told by your health care provider. This includes vitamins and supplements.  · Eat a healthy diet. A healthy diet includes fresh fruits and vegetables, whole grains, healthy fats, and lean proteins.  · If instructed, eat more foods that contain a lot of potassium. This includes:  ? Nuts, such as peanuts and pistachios.  ? Seeds, such as sunflower seeds and pumpkin seeds.  ? Peas, lentils, and lima beans.  ? Whole grain and bran cereals and breads.  ? Fresh fruits and vegetables, such as apricots, avocado, bananas, cantaloupe, kiwi, oranges, tomatoes, asparagus, and potatoes.  ? Orange juice.  ? Tomato juice.  ? Red meats.  ? Yogurt.  · Keep all follow-up visits as told by your health care provider. This is important.  Contact a health care provider if you:  · Have weakness that gets worse.  · Feel your heart pounding or racing.  · Vomit.  · Have diarrhea.  · Have diabetes (diabetes mellitus) and you have trouble keeping your blood sugar (glucose) in your target range.  Get help right away if you:  · Have chest pain.  · Have shortness of breath.  · Have vomiting or  diarrhea that lasts for more than 2 days.  · Faint.  Summary  · Hypokalemia means that the amount of potassium in the blood is lower than normal.  · This condition is diagnosed with a blood test.  · Hypokalemia may be treated by taking potassium supplements, adjusting the medicines that you take, or eating more foods that are high in potassium.  · If your potassium level is very low, you may need to get potassium through an IV and be monitored in the hospital.  This information is not intended to replace advice given to you by your health care provider. Make sure you discuss any questions you have with your health care provider.  Document Released: 12/18/2006 Document Revised: 07/31/2019 Document Reviewed: 07/31/2019  [employer] Patient Education © 2020 [employer]    Depression / Suicide Risk    As you are discharged from this [facility] facility, it is important to learn how to keep safe from harming yourself.    Recognize the warning signs:  · Abrupt changes in personality, positive or negative- including increase in energy   · Giving away possessions  · Change in eating patterns- significant weight changes-  positive or negative  · Change in sleeping patterns- unable to sleep or sleeping all the time   · Unwillingness or inability to communicate  · Depression  · Unusual sadness, discouragement and loneliness  · Talk of wanting to die  · Neglect of personal appearance   · Rebelliousness- reckless behavior  · Withdrawal from people/activities they love  · Confusion- inability to concentrate     If you or a loved one observes any of these behaviors or has concerns about self-harm, here's what you can do:  · Talk about it- your feelings and reasons for harming yourself  · Remove any means that you might use to hurt yourself (examples: pills, rope, extension cords, firearm)  · Get professional help from the community (Mental Health, Substance Abuse, psychological counseling)  · Do not be alone:Call your Safe  Contact- someone whom you trust who will be there for you.  · Call your local CRISIS HOTLINE 425-2094 or 199-170-3573  · Call your local Children's Mobile Crisis Response Team [place] (691) 514-8210 or www.[employer]ango  · Call the toll free National Suicide Prevention Hotlines   · National Suicide Prevention Lifeline 741-593-QNIR (9251)  · National Outsmart Line Network 800-SUICIDE (961-3054)

## 2022-04-17 NOTE — CARE PLAN
Problem: Knowledge Deficit - Standard  Goal: Patient and family/care givers will demonstrate understanding of plan of care, disease process/condition, diagnostic tests and medications  Outcome: Progressing     Problem: Skin Integrity  Goal: Skin integrity is maintained or improved  Outcome: Progressing     Problem: Pain - Standard  Goal: Alleviation of pain or a reduction in pain to the patient’s comfort goal  Outcome: Progressing   The patient is Watcher - Medium risk of patient condition declining or worsening    Shift Goals  Clinical Goals: electrolyte stability, pain control  Patient Goals: comfort, tell jokes    Progress made toward(s) clinical / shift goals:  Pts labs being monitored closely, pain controlled     Patient is not progressing towards the following goals:

## 2022-04-17 NOTE — DISCHARGE SUMMARY
"Discharge Summary    CHIEF COMPLAINT ON ADMISSION  Chief Complaint   Patient presents with   • Chest Pain   • Epigastric Pain       Reason for Admission  ems     Admission Date  4/15/2022    CODE STATUS  DNAR/DNI    HPI & HOSPITAL COURSE    Shena Lane is a 71 y.o. 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 Jose Riley) who presented 4/15/2022 with complaints of chest pain back pain.  Patient stated she was seeing her homeopathic doctor who had ordered IV vitamin C infusion.  This was her first time having an IV infusion of vitamin C.  During the therapy, the patient reported a sudden onset of 10 out of 10 chest pain and her homeopath suggested that she come to the ED.      Work-up in the ED revealed negative troponin, normal sinus rhythm on EKG, and potassium of 2.6.  Patient was admitted for further management of her hypokalemia and to trend troponins    Her hypokalemia persisted and she was given PO/IV supplementation. Additionally, patient reports that she takes \"a lot\" of supplements recommended by her homeopathic doctor, raising suspicion that these may be contributing to her hypokalemia. Her  also follows the philosophy of Nura Hudson, a cancer research who sells supplements. The patient's  brought the bags of supplements in and they were looked over by the hospitalist APRN and pharmacist. No obvious contributing source to the hypokalemia was noted in the provided bags of supplements. The patient and her  were advised that the hypokalemia is likely due to her disease process and to avoid high dose vitamin C infusions. Additionally, they were advised to discuss further choice of supplementation with her oncologist. Echocardiogram completed while inpatient demonstrated     Although am labs were ordered, the potassium redraw did not reoccur until the afternoon of April 17. Repeat was 4.1. " Patient has been advised that she will need to take 20 mg PO daily of potassium supplementation and arrange close follow up with her primary care/oncology physician. Patient refused to complete POLST prior to discharge and advised that she will fax her advanced care documents to the palliative care RN. Home health has been arranged and will be reaching out to patient to arrange visit times. Plan of are discussed with patient and her  and all questions were answered.       Therefore, she is discharged in fair and stable condition to home with close outpatient follow-up.    The patient met 2-midnight criteria for an inpatient stay at the time of discharge.    Discharge Date  4/14/2022    FOLLOW UP ITEMS POST DISCHARGE  Take 20 mg of daily potassium   Make an appointment with your PCP/oncologist for one week to get potassium rechecked    DISCHARGE DIAGNOSES  Principal Problem (Resolved):    Hypokalemia POA: Yes  Active Problems:    Vitamin B12 deficiency POA: Yes    Vitamin D deficiency POA: Yes    DCIS (ductal carcinoma in situ) POA: Yes      Overview: Formatting of this note might be different from the original.      L breast    Bone metastasis (HCC) POA: Yes    Chronic pain syndrome POA: Unknown    ACP (advance care planning) POA: Unknown    Severe protein-calorie malnutrition (HCC) POA: Unknown    Underweight POA: Unknown    Cancer cachexia (HCC) POA: Unknown      FOLLOW UP  Future Appointments   Date Time Provider Department Center   4/20/2022  2:00 PM [facility] None   5/4/2022  1:20 PM Jamin Vance M.D. [facility] None   5/11/2022  3:20 PM Ashish Rios P.A.-C. [facility]   5/19/2022  1:30 PM Jose Riley M.D. [facility] None     [email or web address] [email or web address].  [address]  [address]  [facility] [id number] [phone]    Schedule an appointment as soon as possible for a visit      Jose Riley M.D.  [address]  [facility] [id number] [phone]    Schedule an appointment as soon  "as possible for a visit        MEDICATIONS ON DISCHARGE     Medication List      START taking these medications      Instructions   potassium chloride SA 20 MEQ Tbcr  Commonly known as: Kdur   Take 1 Tablet by mouth 3 times a day.  Dose: 20 mEq        CONTINUE taking these medications      Instructions   acetaminophen 325 MG Tabs  Commonly known as: Tylenol   Take 650 mg by mouth every four hours as needed for Mild Pain.  Dose: 650 mg     ALOE VERA PO   Take 1 Capsule by mouth every morning.  Dose: 1 Capsule     ascorbic acid 1000 MG tablet  Commonly known as: VITAMIN C   Take 1,000 mg by mouth every evening.  Dose: 1,000 mg     B Complex Caps   Take 1 Capsule by mouth every day.  Dose: 1 Capsule     DIGESTIVE ENZYMES PO   Take 1 Capsule by mouth 3 times a day.  Dose: 1 Capsule     Magnesium 250 MG Tabs   Take 250 mg by mouth every evening.  Dose: 250 mg     Non Formulary Request   Take 6.5 g by mouth see administration instructions. \"Mucuna pruriens\" powder. Take 6.5 g by mouth 9 times per day.  Dose: 6.5 g     PROBIOTIC-10 PO   Take 1 Capsule by mouth every day.  Dose: 1 Capsule     TURMERIC PO   Take 2 Tablets by mouth every evening. \"Standard Process Turmeric Forte\"  Dose: 2 Tablet     Tyrosine Powd   Take 1.3 g by mouth see administration instructions. Take 1.3 g by mouth 9 times per day.  Dose: 1.3 g     VITAMIN B 12 PO   Place 1 Tablet under the tongue every morning.  Dose: 1 Tablet     vitamin D3 125 MCG (5000 UT) Caps   Take 1 Capsule by mouth every day.  Dose: 1 Capsule        STOP taking these medications    Potassium 99 MG Tabs            Allergies  Allergies   Allergen Reactions   • Sulfa Drugs Hives       DIET  Orders Placed This Encounter   Procedures   • Diet Order Diet: Regular     Standing Status:   Standing     Number of Occurrences:   1     Order Specific Question:   Diet:     Answer:   Regular [1]       ACTIVITY  As tolerated.  Weight bearing as " tolerated    CONSULTATIONS  None    PROCEDURES  None    LABORATORY  Lab Results   Component Value Date    SODIUM 139 04/16/2022    POTASSIUM 4.1 04/17/2022    CHLORIDE 103 04/16/2022    CO2 25 04/16/2022    GLUCOSE 95 04/16/2022    BUN 14 04/16/2022    CREATININE 0.40 (L) 04/16/2022        Lab Results   Component Value Date    WBC 5.4 04/17/2022    HEMOGLOBIN 12.7 04/17/2022    HEMATOCRIT 35.8 (L) 04/17/2022    PLATELETCT 229 04/17/2022        Total time of the discharge process exceeds 36 minutes.

## 2022-04-23 ENCOUNTER — HOSPITAL ENCOUNTER (EMERGENCY)
Facility: MEDICAL CENTER | Age: 72
End: 2022-04-23
Attending: EMERGENCY MEDICINE
Payer: MEDICARE

## 2022-04-23 ENCOUNTER — APPOINTMENT (OUTPATIENT)
Dept: RADIOLOGY | Facility: MEDICAL CENTER | Age: 72
End: 2022-04-23
Attending: EMERGENCY MEDICINE
Payer: MEDICARE

## 2022-04-23 VITALS
BODY MASS INDEX: 14.4 KG/M2 | DIASTOLIC BLOOD PRESSURE: 77 MMHG | OXYGEN SATURATION: 94 % | WEIGHT: 95 LBS | HEART RATE: 87 BPM | HEIGHT: 68 IN | RESPIRATION RATE: 13 BRPM | TEMPERATURE: 98 F | SYSTOLIC BLOOD PRESSURE: 128 MMHG

## 2022-04-23 DIAGNOSIS — R10.84 GENERALIZED ABDOMINAL PAIN: ICD-10-CM

## 2022-04-23 DIAGNOSIS — K59.00 CONSTIPATION, UNSPECIFIED CONSTIPATION TYPE: ICD-10-CM

## 2022-04-23 DIAGNOSIS — M54.50 ACUTE BILATERAL LOW BACK PAIN WITHOUT SCIATICA: ICD-10-CM

## 2022-04-23 LAB
ALBUMIN SERPL BCP-MCNC: 4.9 G/DL (ref 3.2–4.9)
ALBUMIN/GLOB SERPL: 1.8 G/DL
ALP SERPL-CCNC: 164 U/L (ref 30–99)
ALT SERPL-CCNC: 38 U/L (ref 2–50)
ANION GAP SERPL CALC-SCNC: 12 MMOL/L (ref 7–16)
AST SERPL-CCNC: 32 U/L (ref 12–45)
BASOPHILS # BLD AUTO: 0.5 % (ref 0–1.8)
BASOPHILS # BLD: 0.03 K/UL (ref 0–0.12)
BILIRUB SERPL-MCNC: 0.3 MG/DL (ref 0.1–1.5)
BUN SERPL-MCNC: 26 MG/DL (ref 8–22)
CALCIUM SERPL-MCNC: 10.5 MG/DL (ref 8.5–10.5)
CHLORIDE SERPL-SCNC: 103 MMOL/L (ref 96–112)
CO2 SERPL-SCNC: 18 MMOL/L (ref 20–33)
CREAT SERPL-MCNC: 0.69 MG/DL (ref 0.5–1.4)
EKG IMPRESSION: NORMAL
EOSINOPHIL # BLD AUTO: 0.02 K/UL (ref 0–0.51)
EOSINOPHIL NFR BLD: 0.3 % (ref 0–6.9)
ERYTHROCYTE [DISTWIDTH] IN BLOOD BY AUTOMATED COUNT: 47.7 FL (ref 35.9–50)
GFR SERPLBLD CREATININE-BSD FMLA CKD-EPI: 93 ML/MIN/1.73 M 2
GLOBULIN SER CALC-MCNC: 2.7 G/DL (ref 1.9–3.5)
GLUCOSE SERPL-MCNC: 100 MG/DL (ref 65–99)
HCT VFR BLD AUTO: 43.1 % (ref 37–47)
HGB BLD-MCNC: 14.3 G/DL (ref 12–16)
IMM GRANULOCYTES # BLD AUTO: 0.02 K/UL (ref 0–0.11)
IMM GRANULOCYTES NFR BLD AUTO: 0.3 % (ref 0–0.9)
LYMPHOCYTES # BLD AUTO: 1.25 K/UL (ref 1–4.8)
LYMPHOCYTES NFR BLD: 19.8 % (ref 22–41)
MCH RBC QN AUTO: 32.2 PG (ref 27–33)
MCHC RBC AUTO-ENTMCNC: 33.2 G/DL (ref 33.6–35)
MCV RBC AUTO: 97.1 FL (ref 81.4–97.8)
MONOCYTES # BLD AUTO: 0.39 K/UL (ref 0–0.85)
MONOCYTES NFR BLD AUTO: 6.2 % (ref 0–13.4)
NEUTROPHILS # BLD AUTO: 4.61 K/UL (ref 2–7.15)
NEUTROPHILS NFR BLD: 72.9 % (ref 44–72)
NRBC # BLD AUTO: 0 K/UL
NRBC BLD-RTO: 0 /100 WBC
PLATELET # BLD AUTO: 312 K/UL (ref 164–446)
PMV BLD AUTO: 9.5 FL (ref 9–12.9)
POTASSIUM SERPL-SCNC: 4.6 MMOL/L (ref 3.6–5.5)
PROT SERPL-MCNC: 7.6 G/DL (ref 6–8.2)
RBC # BLD AUTO: 4.44 M/UL (ref 4.2–5.4)
SODIUM SERPL-SCNC: 133 MMOL/L (ref 135–145)
WBC # BLD AUTO: 6.3 K/UL (ref 4.8–10.8)

## 2022-04-23 PROCEDURE — 85025 COMPLETE CBC W/AUTO DIFF WBC: CPT

## 2022-04-23 PROCEDURE — 99285 EMERGENCY DEPT VISIT HI MDM: CPT

## 2022-04-23 PROCEDURE — 700111 HCHG RX REV CODE 636 W/ 250 OVERRIDE (IP): Performed by: EMERGENCY MEDICINE

## 2022-04-23 PROCEDURE — 36415 COLL VENOUS BLD VENIPUNCTURE: CPT

## 2022-04-23 PROCEDURE — 96374 THER/PROPH/DIAG INJ IV PUSH: CPT | Mod: XU

## 2022-04-23 PROCEDURE — 80053 COMPREHEN METABOLIC PANEL: CPT

## 2022-04-23 PROCEDURE — 96375 TX/PRO/DX INJ NEW DRUG ADDON: CPT

## 2022-04-23 PROCEDURE — 700117 HCHG RX CONTRAST REV CODE 255: Performed by: EMERGENCY MEDICINE

## 2022-04-23 PROCEDURE — 93005 ELECTROCARDIOGRAM TRACING: CPT | Performed by: EMERGENCY MEDICINE

## 2022-04-23 PROCEDURE — 93005 ELECTROCARDIOGRAM TRACING: CPT

## 2022-04-23 PROCEDURE — 72131 CT LUMBAR SPINE W/O DYE: CPT | Mod: ME

## 2022-04-23 PROCEDURE — 74177 CT ABD & PELVIS W/CONTRAST: CPT | Mod: ME

## 2022-04-23 PROCEDURE — 700102 HCHG RX REV CODE 250 W/ 637 OVERRIDE(OP): Performed by: EMERGENCY MEDICINE

## 2022-04-23 PROCEDURE — A9270 NON-COVERED ITEM OR SERVICE: HCPCS | Performed by: EMERGENCY MEDICINE

## 2022-04-23 RX ORDER — OXYCODONE HYDROCHLORIDE AND ACETAMINOPHEN 5; 325 MG/1; MG/1
1 TABLET ORAL EVERY 4 HOURS PRN
Qty: 15 TABLET | Refills: 0 | Status: SHIPPED | OUTPATIENT
Start: 2022-04-23 | End: 2022-04-23 | Stop reason: SDUPTHER

## 2022-04-23 RX ORDER — ONDANSETRON 2 MG/ML
4 INJECTION INTRAMUSCULAR; INTRAVENOUS ONCE
Status: COMPLETED | OUTPATIENT
Start: 2022-04-23 | End: 2022-04-23

## 2022-04-23 RX ORDER — OXYCODONE HYDROCHLORIDE AND ACETAMINOPHEN 5; 325 MG/1; MG/1
1 TABLET ORAL EVERY 4 HOURS PRN
Qty: 15 TABLET | Refills: 0 | Status: SHIPPED | OUTPATIENT
Start: 2022-04-23 | End: 2022-04-28

## 2022-04-23 RX ORDER — MORPHINE SULFATE 4 MG/ML
4 INJECTION INTRAVENOUS ONCE
Status: COMPLETED | OUTPATIENT
Start: 2022-04-23 | End: 2022-04-23

## 2022-04-23 RX ORDER — DOCUSATE SODIUM 100 MG/1
100 CAPSULE, LIQUID FILLED ORAL 2 TIMES DAILY
Qty: 60 CAPSULE | Refills: 0 | Status: SHIPPED | OUTPATIENT
Start: 2022-04-23 | End: 2022-04-23 | Stop reason: SDUPTHER

## 2022-04-23 RX ORDER — DOCUSATE SODIUM 100 MG/1
100 CAPSULE, LIQUID FILLED ORAL 2 TIMES DAILY
Qty: 60 CAPSULE | Refills: 0 | Status: SHIPPED | OUTPATIENT
Start: 2022-04-23

## 2022-04-23 RX ADMIN — MAGNESIUM HYDROXIDE 30 ML: 400 SUSPENSION ORAL at 17:52

## 2022-04-23 RX ADMIN — IOHEXOL 70 ML: 350 INJECTION, SOLUTION INTRAVENOUS at 16:46

## 2022-04-23 RX ADMIN — MORPHINE SULFATE 4 MG: 4 INJECTION INTRAVENOUS at 15:23

## 2022-04-23 RX ADMIN — ONDANSETRON 4 MG: 2 INJECTION INTRAMUSCULAR; INTRAVENOUS at 15:23

## 2022-04-23 ASSESSMENT — PAIN DESCRIPTION - PAIN TYPE: TYPE: ACUTE PAIN

## 2022-04-23 ASSESSMENT — FIBROSIS 4 INDEX: FIB4 SCORE: 1.67

## 2022-04-23 NOTE — ED PROVIDER NOTES
ED Provider  Scribed for Prieto Haley D.O. by Lorin Caballero. 4/23/2022  2:46 PM    Means of arrival:Wheel Chair  History obtained from:Patient  History limited by: None    CHIEF COMPLAINT  Chief Complaint   Patient presents with   • Back Pain   • Rib Pain       HPI  Shena Lane is a 71 y.o. female with Parkinson disease who presents for evaluation of low back pain and rib pain. The patient reports that she has a history of back fractures. She states that physical therapy caused her back fracture. Shena has stage 4 breast cancer that has spread to her back. She is currently complaining of a new rib pain. Deep inspiration exacerbates the low back and rib pain. She has associated diarrhea, but denies abdominal pain. No alleviating factors were reported. She is not taking pain medication. The patient is currently not undergoing treatment for the cancer because she needs to first be evaluated by a cardiologist for a heart murmur.     REVIEW OF SYSTEMS  See HPI for further details. All other systems are negative.   Pertinent positives include low back pain, rib pain, and diarrhea. Pertinent negatives include no abdominal pain.      PAST MEDICAL HISTORY   has a past medical history of Breast cancer (HCC), Chicken pox, Glaucoma, Hepatitis A, History of hormone therapy, Jaundice, Measles, and Parkinson disease (HCC).    SOCIAL HISTORY  Social History     Tobacco Use   • Smoking status: Never Smoker   • Smokeless tobacco: Never Used   Vaping Use   • Vaping Use: Never used   Substance and Sexual Activity   • Alcohol use: Never   • Drug use: Yes     Comment: CBD oil   • Sexual activity: None       SURGICAL HISTORY   has a past surgical history that includes other abdominal surgery and other orthopedic surgery.    CURRENT MEDICATIONS  Home Medications     Reviewed by Tamra Jacobs R.N. (Registered Nurse) on 04/23/22 at 1418  Med List Status: Partial   Medication Last Dose Status   acetaminophen (TYLENOL) 325 MG Tab   "Active   ALOE VERA PO  Active   ascorbic acid (VITAMIN C) 1000 MG tablet  Active   B Complex Cap  Active   Cholecalciferol (VITAMIN D3) 125 MCG (5000 UT) Cap  Active   Cyanocobalamin (VITAMIN B 12 PO)  Active   DIGESTIVE ENZYMES PO  Active   Magnesium 250 MG Tab  Active   Non Formulary Request  Active   potassium chloride SA (KDUR) 20 MEQ Tab CR  Active   Probiotic Product (PROBIOTIC-10 PO)  Active   TURMERIC PO  Active   Tyrosine Powder  Active                ALLERGIES  Allergies   Allergen Reactions   • Sulfa Drugs Hives       PHYSICAL EXAM  VITAL SIGNS: /93   Pulse (!) 107   Temp 36.4 °C (97.6 °F) (Temporal)   Resp 18   Ht 1.727 m (5' 8\")   Wt 43.1 kg (95 lb)   SpO2 97%   BMI 14.44 kg/m²   Constitutional: Alert in no apparent distress.  HENT: No signs of trauma, mucous membranes are moist  Eyes: Conjunctiva normal, Non-icteric.   Neck: Normal range of motion, No tenderness, Supple.  Lymphatic: No lymphadenopathy noted.   Cardiovascular: Regular rate and rhythm, no murmurs.   Thorax & Lungs: Normal breath sounds, No respiratory distress, No wheezing, No chest tenderness.   Abdomen: Bowel sounds normal, Soft, No tenderness, No masses, No pulsatile masses. No peritoneal signs.  Skin: Warm, Dry, normal color.   Back: No bony tenderness, No CVA tenderness.   Extremities: No edema, No tenderness, No cyanosis  Musculoskeletal: Good range of motion in all major joints. No tenderness to palpation or major deformities noted.   Neurologic: Alert and oriented x4, Normal motor function, Normal sensory function, No focal deficits noted.   Psychiatric: Affect normal, Judgment normal, Mood normal.         DIAGNOSTIC STUDIES / PROCEDURES    EKG  12 Lead EKG interpreted by me shown below.     LABS  Results for orders placed or performed during the hospital encounter of 04/23/22   CBC WITH DIFFERENTIAL   Result Value Ref Range    WBC 6.3 4.8 - 10.8 K/uL    RBC 4.44 4.20 - 5.40 M/uL    Hemoglobin 14.3 12.0 - 16.0 g/dL "    Hematocrit 43.1 37.0 - 47.0 %    MCV 97.1 81.4 - 97.8 fL    MCH 32.2 27.0 - 33.0 pg    MCHC 33.2 (L) 33.6 - 35.0 g/dL    RDW 47.7 35.9 - 50.0 fL    Platelet Count 312 164 - 446 K/uL    MPV 9.5 9.0 - 12.9 fL    Neutrophils-Polys 72.90 (H) 44.00 - 72.00 %    Lymphocytes 19.80 (L) 22.00 - 41.00 %    Monocytes 6.20 0.00 - 13.40 %    Eosinophils 0.30 0.00 - 6.90 %    Basophils 0.50 0.00 - 1.80 %    Immature Granulocytes 0.30 0.00 - 0.90 %    Nucleated RBC 0.00 /100 WBC    Neutrophils (Absolute) 4.61 2.00 - 7.15 K/uL    Lymphs (Absolute) 1.25 1.00 - 4.80 K/uL    Monos (Absolute) 0.39 0.00 - 0.85 K/uL    Eos (Absolute) 0.02 0.00 - 0.51 K/uL    Baso (Absolute) 0.03 0.00 - 0.12 K/uL    Immature Granulocytes (abs) 0.02 0.00 - 0.11 K/uL    NRBC (Absolute) 0.00 K/uL   COMP METABOLIC PANEL   Result Value Ref Range    Sodium 133 (L) 135 - 145 mmol/L    Potassium 4.6 3.6 - 5.5 mmol/L    Chloride 103 96 - 112 mmol/L    Co2 18 (L) 20 - 33 mmol/L    Anion Gap 12.0 7.0 - 16.0    Glucose 100 (H) 65 - 99 mg/dL    Bun 26 (H) 8 - 22 mg/dL    Creatinine 0.69 0.50 - 1.40 mg/dL    Calcium 10.5 8.5 - 10.5 mg/dL    AST(SGOT) 32 12 - 45 U/L    ALT(SGPT) 38 2 - 50 U/L    Alkaline Phosphatase 164 (H) 30 - 99 U/L    Total Bilirubin 0.3 0.1 - 1.5 mg/dL    Albumin 4.9 3.2 - 4.9 g/dL    Total Protein 7.6 6.0 - 8.2 g/dL    Globulin 2.7 1.9 - 3.5 g/dL    A-G Ratio 1.8 g/dL   ESTIMATED GFR   Result Value Ref Range    GFR (CKD-EPI) 93 >60 mL/min/1.73 m 2   EKG (NOW)   Result Value Ref Range    Report       [facility] Emergency Dept.    Test Date:  2022  Pt Name:    ALLY CLARK             Department: ER  MRN:        [id number]                      Room:  Gender:     Female                       Technician: [id number]  :        [id number]                   Requested By:ER TRIAGE PROTOCOL  Order #:    [id number]                    Reading MD: RACHEL GAXIOLA D.O.    Measurements  Intervals                                 Axis  Rate:       103                          P:          61  IA:         132                          QRS:        21  QRSD:       71                           T:          70  QT:         332  QTc:        434    Interpretive Statements  Sinus tachycardia  Right atrial enlargement  Compared to ECG 04/15/2022 16:03:27  Atrial abnormality now present  Sinus rhythm no longer present  ST (T wave) deviation no longer present  Electronically Signed On 4- 17:12:53 PDT by RACHEL GAXIOLA D.O.         All labs reviewed by me.    RADIOLOGY  CT-ABDOMEN-PELVIS WITH   Final Result         1. No acute inflammatory change in the abdomen or pelvis.      2. Nonobstructive right renal calculus. Mild prominence of the right renal pelvis of unclear etiology.      3. Moderate amount of stool throughout the colon.      CT-LSPINE W/O PLUS RECONS   Final Result      1.  No lumbar spine fracture.   2.  Mild multilevel degenerative change with levoconvex curvature.   3.  Mild lateral subluxation of L4 on L5 toward the LEFT side.        The radiologist's interpretations of all radiological studies have been reviewed by me.    Films have been independently by me      COURSE  Pertinent Labs & Imaging studies reviewed. (See chart for details)    2:46 PM - Patient seen and examined at bedside. Discussed plan of care. The patient will be medicated with Zofran 4 mg and morphine 4 mg/mL. Ordered for CT-abdomen, CT-Lspine, CBC with diff, CMP, and EKG to evaluate her symptoms.       MEDICAL DECISION MAKING  This is a 71 y.o. female who presents with known metastatic disease and pathologic fracture/compression fractures of the back.  She is not on pain medications at home is presenting with worsening back pain.  Back pain is worse with movement including deep breaths.    Lung sounds are clear, and equal.    She is also complaining of band type of discomfort to the abdomen.  Feels like it is coming from the lumbar spine.  CT of the lumbar  spine shows no pathology there, but she is constipated on CT abdomen pelvis.  This may be the etiology of her pain.    She will be given a laxative here and I discharged home with pain medication along with a stool softener.  This patient has known metastatic disease and will require further follow-up from her primary doctors for pain management pain control.        The patient will return for new or worsening symptoms and is stable at the time of discharge.    The patient is referred to a primary physician for blood pressure management, diabetic screening, and for all other preventative health concerns.    In prescribing controlled substances to this patient, I certify that I have obtained and reviewed the medical history of Shena Lane. I have also made a good isra effort to obtain applicable records from other providers who have treated the patient and records did not demonstrate any increased risk of substance abuse that would prevent me from prescribing controlled substances.     I have conducted a physical exam and documented it. I have reviewed Ms. Lane’s prescription history as maintained by the [place] Prescription Monitoring Program.     I have assessed the patient’s risk for abuse, dependency, and addiction using the validated Opioid Risk Tool available at https://www.mdcalc.com/dslmwh-ugsq-kohm-ort-narcotic-abuse.     Given the above, I believe the benefits of controlled substance therapy outweigh the risks. The reasons for prescribing controlled substances include non-narcotic, oral analgesic alternatives have been inadequate for pain control. Accordingly, I have discussed the risk and benefits, treatment plan, and alternative therapies with the patient.     She was medicated with morphine IV and this did improve/resolve her pain.    DISPOSITION:  Patient will be discharged home in stable condition.    FOLLOW UP:  Ashish Rios, P.A.-C.  [address]  [address]  [place]  [address]  [phone]    In 3 days        OUTPATIENT MEDICATIONS:  New Prescriptions    DOCUSATE SODIUM (COLACE) 100 MG CAP    Take 1 Capsule by mouth 2 times a day.    OXYCODONE-ACETAMINOPHEN (PERCOCET) 5-325 MG TAB    Take 1 Tablet by mouth every four hours as needed for Moderate Pain (Pain) for up to 5 days.         FINAL IMPRESSION  1. Acute bilateral low back pain without sciatica    2. Generalized abdominal pain    3. Constipation, unspecified constipation type         I, Lorin Caballero (Sarita), am scribing for, and in the presence of, Prieto Haley D.O..    Electronically signed by: Lorin Caballero (Sarita), 4/23/2022    I, Prieto Haley D.O. personally performed the services described in this documentation, as scribed by Lorin Caballero in my presence, and it is both accurate and complete.    The note accurately reflects work and decisions made by me.  Prieto Haley D.O.  4/23/2022  5:19 PM

## 2022-04-23 NOTE — ED NOTES
Return from imaging. No pain at this time. Patient smiling, resting in NAD. Updated to POC and estimated timeframes. Denies needs. Call light in lap. Visitor present.

## 2022-04-23 NOTE — ED TRIAGE NOTES
"Chief Complaint   Patient presents with   • Back Pain   • Rib Pain     Pt reports that she has stage 4 breast ca that mets to bones.  Unable to tolerate pain as her pain to her torso is so severe every time she takes a breath or sneezes she has unbearable pain.  Has no pain control at home.   /93   Pulse (!) 107   Temp 36.4 °C (97.6 °F) (Temporal)   Resp 18   Ht 1.727 m (5' 8\")   Wt 43.1 kg (95 lb)   SpO2 97%     "

## 2022-04-23 NOTE — ED NOTES
Medicated as ordered. Call light in reach. Personal belongings in reach. Denies other needs. CT pending. Will continue to assess and treat as appropriate.

## 2022-04-24 NOTE — ED NOTES
Written and verbal discharge instructions given to patient. Patient acknowledges and reports understanding of instructions.  Patient is agreeable to discharge at this time.  D/C to home with caregiver.

## 2022-04-24 NOTE — DISCHARGE INSTRUCTIONS
CT scan shows no fracture of the spine, does show that you are constipated.  You are receiving a laxative here to get your bowels moving.  This may cause loose stools the next 4 to 8 hours.    You are being prescribed a narcotic pain medication this is for the pain that you are having from your metastatic disease.  Understand that narcotics may contribute to constipation you are being placed on stool softener to assist with these kind of problems.  Please continue follow-up with your oncologist and primary care providers for further treatment.

## 2022-05-04 ENCOUNTER — OFFICE VISIT (OUTPATIENT)
Dept: CARDIOLOGY | Facility: MEDICAL CENTER | Age: 72
End: 2022-05-04
Payer: MEDICARE

## 2022-05-04 VITALS
HEIGHT: 68 IN | WEIGHT: 96.2 LBS | OXYGEN SATURATION: 100 % | HEART RATE: 73 BPM | BODY MASS INDEX: 14.58 KG/M2 | SYSTOLIC BLOOD PRESSURE: 110 MMHG | DIASTOLIC BLOOD PRESSURE: 70 MMHG | RESPIRATION RATE: 20 BRPM

## 2022-05-04 DIAGNOSIS — R01.1 HEART MURMUR: ICD-10-CM

## 2022-05-04 PROCEDURE — 93000 ELECTROCARDIOGRAM COMPLETE: CPT | Mod: GZ | Performed by: INTERNAL MEDICINE

## 2022-05-04 PROCEDURE — 99204 OFFICE O/P NEW MOD 45 MIN: CPT | Performed by: INTERNAL MEDICINE

## 2022-05-04 ASSESSMENT — ENCOUNTER SYMPTOMS
VOMITING: 0
ABDOMINAL PAIN: 0
COUGH: 0
CONSTIPATION: 0
IRREGULAR HEARTBEAT: 0
BLURRED VISION: 0
CLAUDICATION: 0
DIZZINESS: 0
FLANK PAIN: 0
FEVER: 0
BACK PAIN: 0
PALPITATIONS: 0
DYSPNEA ON EXERTION: 0
SHORTNESS OF BREATH: 0
SYNCOPE: 0
DECREASED APPETITE: 0
DIARRHEA: 0
DEPRESSION: 0
ALTERED MENTAL STATUS: 0
WEIGHT LOSS: 0
ORTHOPNEA: 0
NEAR-SYNCOPE: 0
NAUSEA: 0
WEIGHT GAIN: 0
HEARTBURN: 0
PND: 0

## 2022-05-04 ASSESSMENT — FIBROSIS 4 INDEX: FIB4 SCORE: 1.18

## 2022-05-04 NOTE — PROGRESS NOTES
Cardiology Note    Chief Complaint   Patient presents with   • Heart Murmur     NP       History of Present Illness: Shena Lane is a 71 y.o. female PMH advanced stage  breast cancer with metastases who presents for initial visit.    No active cardiac complaints. Describes referred for evaluation of murmur heard by her oncologist. Recently underwent echocardiogram. No toxic social habits. No relevant family history.    Review of Systems   Constitutional: Negative for decreased appetite, fever, malaise/fatigue, weight gain and weight loss.   HENT: Negative for congestion and nosebleeds.    Eyes: Negative for blurred vision.   Cardiovascular: Negative for chest pain, claudication, dyspnea on exertion, irregular heartbeat, leg swelling, near-syncope, orthopnea, palpitations, paroxysmal nocturnal dyspnea and syncope.   Respiratory: Negative for cough and shortness of breath.    Endocrine: Negative for cold intolerance and heat intolerance.   Skin: Negative for rash.   Musculoskeletal: Negative for back pain.   Gastrointestinal: Negative for abdominal pain, constipation, diarrhea, heartburn, melena, nausea and vomiting.   Genitourinary: Negative for dysuria, flank pain and hematuria.   Neurological: Negative for dizziness.   Psychiatric/Behavioral: Negative for altered mental status and depression.         Past Medical History:   Diagnosis Date   • Breast cancer (HCC)    • Chicken pox    • Glaucoma    • Hepatitis A    • History of hormone therapy    • Jaundice    • Measles    • Parkinson disease (HCC)          Past Surgical History:   Procedure Laterality Date   • OTHER ABDOMINAL SURGERY     • OTHER ORTHOPEDIC SURGERY           Current Outpatient Medications   Medication Sig Dispense Refill   • oxyCODONE-acetaminophen (PERCOCET) 5-325 MG Tab Take 1 Tablet by mouth every four hours as needed for Severe Pain for up to 15 days. 90 Tablet 0   • docusate sodium (COLACE) 100 MG Cap Take 1 Capsule by mouth 2 times a day. 60  "Capsule 0   • potassium chloride SA (KDUR) 20 MEQ Tab CR Take 1 Tablet by mouth 3 times a day. 60 Tablet 11   • Cholecalciferol (VITAMIN D3) 125 MCG (5000 UT) Cap Take 1 Capsule by mouth every day.     • Magnesium 250 MG Tab Take 250 mg by mouth every evening.     • acetaminophen (TYLENOL) 325 MG Tab Take 650 mg by mouth every four hours as needed for Mild Pain.     • Non Formulary Request Take 6.5 g by mouth see administration instructions. \"Mucuna pruriens\" powder. Take 6.5 g by mouth 9 times per day.     • Tyrosine Powder Take 1.3 g by mouth see administration instructions. Take 1.3 g by mouth 9 times per day.     • TURMERIC PO Take 2 Tablets by mouth every evening. \"Standard Process Turmeric Forte\"     • Cyanocobalamin (VITAMIN B 12 PO) Place 1 Tablet under the tongue every morning.     • DIGESTIVE ENZYMES PO Take 1 Capsule by mouth 3 times a day.     • Probiotic Product (PROBIOTIC-10 PO) Take 1 Capsule by mouth every day.     • ascorbic acid (VITAMIN C) 1000 MG tablet Take 1,000 mg by mouth every evening.     • B Complex Cap Take 1 Capsule by mouth every day.       No current facility-administered medications for this visit.         Allergies   Allergen Reactions   • Sulfa Drugs Hives         Family History   Problem Relation Age of Onset   • Cancer Mother    • Hypertension Mother    • Hypertension Sister          Social History     Socioeconomic History   • Marital status:      Spouse name: Not on file   • Number of children: Not on file   • Years of education: Not on file   • Highest education level: Not on file   Occupational History   • Not on file   Tobacco Use   • Smoking status: Never Smoker   • Smokeless tobacco: Never Used   Vaping Use   • Vaping Use: Never used   Substance and Sexual Activity   • Alcohol use: Never   • Drug use: Yes     Comment: CBD oil   • Sexual activity: Not on file   Other Topics Concern   • Not on file   Social History Narrative   • Not on file     Social Determinants of " "Health     Financial Resource Strain: Not on file   Food Insecurity: Not on file   Transportation Needs: Not on file   Physical Activity: Not on file   Stress: Not on file   Social Connections: Not on file   Intimate Partner Violence: Not on file   Housing Stability: Not on file         Physical Exam:  Ambulatory Vitals  /70 (BP Location: Right arm, Patient Position: Sitting, BP Cuff Size: Adult)   Pulse 73   Resp 20   Ht 1.727 m (5' 8\")   Wt 43.6 kg (96 lb 3.2 oz)   SpO2 100%    BP Readings from Last 4 Encounters:   05/04/22 110/70   05/02/22 123/79   04/27/22 106/66   04/23/22 128/77     Weight/BMI:   Vitals:    05/04/22 1335   BP: 110/70   Weight: 43.6 kg (96 lb 3.2 oz)   Height: 1.727 m (5' 8\")    Body mass index is 14.63 kg/m².  Wt Readings from Last 4 Encounters:   05/04/22 43.6 kg (96 lb 3.2 oz)   05/02/22 43.2 kg (95 lb 3.2 oz)   04/23/22 43.1 kg (95 lb)   04/16/22 44 kg (97 lb)       Physical Exam  Constitutional:       General: She is not in acute distress.  HENT:      Head: Normocephalic and atraumatic.   Eyes:      Conjunctiva/sclera: Conjunctivae normal.      Pupils: Pupils are equal, round, and reactive to light.   Neck:      Vascular: No JVD.   Cardiovascular:      Rate and Rhythm: Normal rate and regular rhythm.      Heart sounds: Normal heart sounds. No murmur heard.    No friction rub. No gallop.   Pulmonary:      Effort: Pulmonary effort is normal. No respiratory distress.      Breath sounds: Normal breath sounds. No wheezing or rales.   Chest:      Chest wall: No tenderness.   Abdominal:      General: Bowel sounds are normal. There is no distension.      Palpations: Abdomen is soft.   Musculoskeletal:      Cervical back: Normal range of motion and neck supple.   Skin:     General: Skin is warm and dry.   Neurological:      Mental Status: She is alert and oriented to person, place, and time.   Psychiatric:         Mood and Affect: Affect normal.         Judgment: Judgment normal. "         Lab Data Review:  No results found for: CHOLSTRLTOT, LDL, HDL, TRIGLYCERIDE    Lab Results   Component Value Date/Time    SODIUM 133 (L) 04/23/2022 02:50 PM    POTASSIUM 4.6 04/23/2022 02:50 PM    CHLORIDE 103 04/23/2022 02:50 PM    CO2 18 (L) 04/23/2022 02:50 PM    GLUCOSE 100 (H) 04/23/2022 02:50 PM    BUN 26 (H) 04/23/2022 02:50 PM    CREATININE 0.69 04/23/2022 02:50 PM     CrCl cannot be calculated (Patient's most recent lab result is older than the maximum 7 days allowed.).  Lab Results   Component Value Date/Time    ALKPHOSPHAT 164 (H) 04/23/2022 02:50 PM    ASTSGOT 32 04/23/2022 02:50 PM    ALTSGPT 38 04/23/2022 02:50 PM    TBILIRUBIN 0.3 04/23/2022 02:50 PM      Lab Results   Component Value Date/Time    WBC 6.3 04/23/2022 02:50 PM     Lab Results   Component Value Date/Time    HBA1C 5.4 10/18/2021 05:20 PM     No components found for: TROP      Cardiac Imaging and Procedures Review:      EKG 5/4/22 interpreted by me sinus rhythm    TTE 4/17/22  CONCLUSIONS  No prior study is available for comparison.   Moderate concentric left ventricular hypertrophy.   Normal left ventricular systolic function.   The left ventricular ejection fraction is visually estimated to be 70%.   Normal regional wall motion.   Grade II diastolic dysfunction.  No hemodynamically significant valvular heart disease noted.  Left Atrium  Normal left atrial size. Left atrial volume index is 19  mL/sq m.  [by my read diastolic function is more likely normal with mitral annular tissue doppler velocities and left atrial size in normal range]    Medical Decision Making:  Problem List Items Addressed This Visit     Heart murmur        Physiologic murmur. Normal cardiac function on echocardiogram. Continue follow up with oncology. From cardiac perspective no contraindication to treatment.     It was my pleasure to meet with Ms. Lane.

## 2022-05-06 LAB — EKG IMPRESSION: NORMAL
